# Patient Record
Sex: FEMALE | Race: WHITE | NOT HISPANIC OR LATINO | Employment: UNEMPLOYED | URBAN - METROPOLITAN AREA
[De-identification: names, ages, dates, MRNs, and addresses within clinical notes are randomized per-mention and may not be internally consistent; named-entity substitution may affect disease eponyms.]

---

## 2022-06-20 ENCOUNTER — OFFICE VISIT (OUTPATIENT)
Dept: INTERNAL MEDICINE CLINIC | Age: 27
End: 2022-06-20

## 2022-06-20 VITALS
DIASTOLIC BLOOD PRESSURE: 84 MMHG | OXYGEN SATURATION: 99 % | HEIGHT: 62 IN | WEIGHT: 105 LBS | TEMPERATURE: 99.5 F | BODY MASS INDEX: 19.32 KG/M2 | SYSTOLIC BLOOD PRESSURE: 114 MMHG | HEART RATE: 82 BPM

## 2022-06-20 DIAGNOSIS — Z13.29 SCREENING FOR THYROID DISORDER: ICD-10-CM

## 2022-06-20 DIAGNOSIS — Z12.4 SCREENING FOR CERVICAL CANCER: ICD-10-CM

## 2022-06-20 DIAGNOSIS — Z00.00 ANNUAL PHYSICAL EXAM: Primary | ICD-10-CM

## 2022-06-20 DIAGNOSIS — Z13.228 SCREENING FOR METABOLIC DISORDER: ICD-10-CM

## 2022-06-20 DIAGNOSIS — Z11.4 SCREENING FOR HIV (HUMAN IMMUNODEFICIENCY VIRUS): ICD-10-CM

## 2022-06-20 DIAGNOSIS — Z13.21 ENCOUNTER FOR VITAMIN DEFICIENCY SCREENING: ICD-10-CM

## 2022-06-20 DIAGNOSIS — Z11.59 NEED FOR HEPATITIS C SCREENING TEST: ICD-10-CM

## 2022-06-20 DIAGNOSIS — F41.9 ANXIETY: ICD-10-CM

## 2022-06-20 DIAGNOSIS — Z13.6 SCREENING FOR CARDIOVASCULAR CONDITION: ICD-10-CM

## 2022-06-20 PROCEDURE — 99385 PREV VISIT NEW AGE 18-39: CPT | Performed by: STUDENT IN AN ORGANIZED HEALTH CARE EDUCATION/TRAINING PROGRAM

## 2022-06-20 NOTE — PROGRESS NOTES
ADULT ANNUAL [address] [facility]    NAME: Kristina Noyola  AGE: 24 y o  SEX: female  : 1995     DATE: 2022     Assessment and Plan:     Problem List Items Addressed This Visit        Other    Anxiety    Relevant Medications    sertraline (Zoloft) 50 mg tablet      Other Visit Diagnoses     Annual physical exam    -  Primary    Screening for cardiovascular condition        Relevant Orders    CBC and Platelet    Screening for metabolic disorder        Relevant Orders    Comprehensive metabolic panel    Screening for thyroid disorder        Relevant Orders    TSH, 3rd generation with Free T4 reflex    Encounter for vitamin deficiency screening        Relevant Orders    Vitamin D 25 hydroxy    Screening for cervical cancer        Relevant Orders    Ambulatory referral to Obstetrics / Gynecology    Screening for HIV (human immunodeficiency virus)        Relevant Orders    HIV 1/2 Antigen/Antibody (4th Generation) w Reflex [facility]    Need for hepatitis C screening test        Relevant Orders    Hepatitis C antibody        1  Annual physical exam   2  Screening for cardiovascular condition  3  Screening for metabolic disorder  4  Screening for thyroid disorder  5  Encounter for vitamin deficiency screening  -no labs or follow-up in many years  -will check labs as mentioned above     6  Screening for cervical cancer  -no history of gyn issues at this time  -will refer to Gynecology at this time for routine well-woman exam, patient is also overdue for Pap smear and HPV co-testing    7  Screening for HIV  8  Need for hepatitis C screen test  -patient has never been screened before for HIV or hepatitis C  -after discussion regarding benefits of screening, patient is agreeable and orders replaced as mentioned above    9   Anxiety   -patient likely has been experiencing chronic anxiety that has recently been unmasked by the stress of her upcoming immigration interview  -PHQ 9 score of 7, indicating mild-moderate depression  -treatment options were discussed, including, but not limited to, referral to behavioral health, pharmacotherapy, or referral to Psychiatry  -patient expressed interest in pharmacotherapy  -we will therefore start Zoloft/sertraline 50 mg daily at this time as her sibling responded very well to this medication for similar symptoms  -90 day supply of medication provided to patient at this time, informed that she will need to call our office to request refill and to review symptom management in 3 months    Immunizations and preventive care screenings were discussed with patient today  Appropriate education was printed on patient's after visit summary  Counseling:  Alcohol/drug use: discussed moderation in alcohol intake, the recommendations for healthy alcohol use, and avoidance of illicit drug use  Dental Health: discussed importance of regular tooth brushing, flossing, and dental visits  Injury prevention: discussed safety/seat belts, safety helmets, smoke detectors, carbon dioxide detectors, and smoking near bedding or upholstery  Sexual health: discussed sexually transmitted diseases, partner selection, use of condoms, avoidance of unintended pregnancy, and contraceptive alternatives  · Exercise: the importance of regular exercise/physical activity was discussed  Recommend exercise 3-5 times per week for at least 30 minutes  Kim Brush is a 24 y o  female with no significant past medical history who presents to the office today as a new patient/to establish care  She is present here today in the office with 1 of her 2 older sisters  Patient used to see an FP, Dr Ly Mejía, locally in [place] but has not been seen in the last 5-6 years  She tells me she does not have a significant past medical history  She has no surgical history    Her past family history is significant for 2 sisters with anxiety, a mother with likely undiagnosed depression, and a healthy father  She tells me she does not take any prescription medications, only a women's Daily multivitamin and Tylenol/NSAIDs as needed for occasional headaches  She denies environmental, food, or drug allergies  She does not smoke or chew tobacco   She vapes flavored nicotine products on a daily basis for the last 2-3 years  She qualifies herself as a social alcohol drinker  She states that she would normally during roughly 2-3 days per week, roughly 1-2 drinks at a time  She has no history of bingeing  She states she has not had a drink of alcohol in the last 3-4 months  She tells me she is   She states that she is sexually active with her  and that they use condoms  She tells me she is not on birth control  She denies any history of gynecologic issues, telling me that she has regular periods every 3-3 5 weeks lasting for 4 days at a time  Patient is an immigrant from [place], though she has been in this country for many years  She tells me that she is currently undergoing the [facility] immigration process  She states that this process is causing her significant anxiety  She tells me that she believes she likely has suffered from some degree of baseline anxiety for at least several years, but that it has been worse in the last 2-2 5 weeks with her worry about this upcoming integration interview  She states that her primary symptom of her anxiety is nausea, though she denies actual vomiting  She tells me she had 1 episode of a hot flash a couple weeks ago that resolved spontaneously after a few minutes  She also tells me that last weekend she had 1 episode of acute abdominal pain that resolved within a couple of hours  She tells me she suffers from occasional palpitations and subjective shortness of breath/worry of about her breathing    She states that she mostly notices the symptoms of nausea and palpitations particularly when she is not active or not distracted by activities  She states she has not been treated for anxiety in the past nor has she spoken to behavioral health in the past   She denies suicidal or homicidal ideations  She states she is interested in treatment as sisters both take medication for anxiety and feels significantly better now because of it  Depression Screening and Follow-up Plan: Patient's depression screening was positive with a PHQ-2 score of 3  Their PHQ-9 score was 7  Patient assessed for underlying major depression  Brief counseling provided and recommend additional follow-up/re-evaluation next office visit  Return in about 1 year (around 6/20/2023) for Annual physical      Chief Complaint:     Chief Complaint   Patient presents with   [address]     Has not been to a doctor in over 7 years   Anxiety      History of Present Illness:     Adult Annual Physical   Patient here for a comprehensive physical exam  The patient reports problems - anxiety  Diet and Physical Activity  · Diet/Nutrition: well balanced diet  · Exercise: vigorous cardiovascular exercise, 3-4 times a week on average and 1-2 hours on average  Depression Screening  PHQ-2/9 Depression Screening    Little interest or pleasure in doing things: 2 - more than half the days  Feeling down, depressed, or hopeless: 1 - several days  Trouble falling or staying asleep, or sleeping too much: 0 - not at all  Feeling tired or having little energy: 1 - several days  Poor appetite or overeating: 3 - nearly every day  Feeling bad about yourself - or that you are a failure or have let yourself or your family down: 0 - not at all  Trouble concentrating on things, such as reading the newspaper or watching television: 0 - not at all  Moving or speaking so slowly that other people could have noticed   Or the opposite - being so fidgety or restless that you have been moving around a lot more than usual: 0 - not at all  Thoughts that you would be better off dead, or of hurting yourself in some way: 0 - not at all  PHQ-2 Score: 3  PHQ-2 Interpretation: POSITIVE depression screen  PHQ-9 Score: 7   PHQ-9 Interpretation: Mild depression        General Health  · Sleep: sleeps well  · Hearing: normal - bilateral   · Vision: no vision problems  · Dental: regular dental visits  /GYN Health  · Contraceptive method: barrier methods  · History of STDs?: no      Review of Systems:     Review of Systems   Constitutional: Negative for chills, fatigue and fever  HENT: Negative for congestion, postnasal drip, rhinorrhea, sinus pressure, sinus pain and sore throat  Respiratory: Negative for cough, shortness of breath and wheezing  Cardiovascular: Negative for chest pain and palpitations  Gastrointestinal: Negative for abdominal pain, constipation, diarrhea, nausea and vomiting  Genitourinary: Negative for difficulty urinating, dysuria, frequency, hematuria and urgency  Musculoskeletal: Negative for arthralgias, back pain, gait problem and myalgias  Skin: Negative for pallor, rash and wound  Neurological: Negative for dizziness, weakness, light-headedness, numbness and headaches  Psychiatric/Behavioral: Negative for behavioral problems, self-injury and suicidal ideas  The patient is nervous/anxious  Past Medical History:     History reviewed  No pertinent past medical history  Past Surgical History:     History reviewed  No pertinent surgical history     Social History:     Social History     Socioeconomic History    Marital status: /Civil Union     Spouse name: None    Number of children: None    Years of education: None    Highest education level: None   Occupational History    None   Tobacco Use    Smoking status: Never Smoker    Smokeless tobacco: Never Used   Vaping Use    Vaping Use: Every day    Substances: Nicotine, Flavoring   Substance and Sexual Activity    Alcohol use: Yes     Comment: weekends/social    Drug use: Not Currently     Types: Marijuana    Sexual activity: Yes     Partners: Male     Birth control/protection: Condom Male   Other Topics Concern    None   Social History Narrative    None     Social Determinants of Health     Financial Resource Strain: Not on file   Food Insecurity: Not on file   Transportation Needs: Not on file   Physical Activity: Not on file   Stress: Not on file   Social Connections: Not on file   Intimate Partner Violence: Not on file   Housing Stability: Not on file      Family History:     Family History   Problem Relation Age of Onset    No Known Problems Mother     No Known Problems Father     Anxiety disorder Sister     Anxiety disorder Sister       Current Medications:     Current Outpatient Medications   Medication Sig Dispense Refill    sertraline (Zoloft) 50 mg tablet Take 1 tablet (50 mg total) by mouth daily 90 tablet 0     No current facility-administered medications for this visit  Allergies:     No Known Allergies   Physical Exam:     /84 (BP Location: Left arm, Patient Position: Sitting, Cuff Size: Adult)   Pulse 82   Temp 99 5 °F (37 5 °C) (Temporal)   Ht 5' 2" (1 575 m)   Wt 47 6 kg (105 lb)   SpO2 99% Comment: ra  BMI 19 20 kg/m²     Physical Exam  Vitals and nursing note reviewed  Constitutional:       General: She is not in acute distress  Appearance: Normal appearance  She is normal weight  She is not ill-appearing, toxic-appearing or diaphoretic  Comments: Patient is pleasant, calm, cooperative  She is seated comfortably on exam table in no acute distress  HENT:      Head: Normocephalic and atraumatic  Eyes:      General: No scleral icterus  Extraocular Movements: Extraocular movements intact  Conjunctiva/sclera: Conjunctivae normal       Pupils: Pupils are equal, round, and reactive to light  Cardiovascular:      Rate and Rhythm: Normal rate and regular rhythm  Pulses: Normal pulses  Heart sounds: Normal heart sounds  No murmur heard  No friction rub  No gallop  Pulmonary:      Effort: Pulmonary effort is normal  No respiratory distress  Breath sounds: Normal breath sounds  No stridor  No wheezing or rhonchi  Abdominal:      General: Abdomen is flat  Bowel sounds are normal  There is no distension  Palpations: Abdomen is soft  There is no mass  Tenderness: There is no abdominal tenderness  There is no right CVA tenderness, left CVA tenderness, guarding or rebound  Hernia: No hernia is present  Musculoskeletal:         General: No swelling, tenderness, deformity or signs of injury  Cervical back: Neck supple  Right lower leg: No edema  Left lower leg: No edema  Lymphadenopathy:      Cervical: No cervical adenopathy  Skin:     General: Skin is warm and dry  Capillary Refill: Capillary refill takes less than 2 seconds  Coloration: Skin is not pale  Findings: No erythema or rash  Neurological:      General: No focal deficit present  Mental Status: She is alert  Psychiatric:         Mood and Affect: Mood normal          Behavior: Behavior normal          Thought Content:  Thought content normal          Judgment: Judgment normal           Kaiser Saleem DO   [address]

## 2022-06-20 NOTE — PATIENT INSTRUCTIONS

## 2022-10-18 ENCOUNTER — OFFICE VISIT (OUTPATIENT)
Dept: URGENT CARE | Age: 27
End: 2022-10-18
Payer: COMMERCIAL

## 2022-10-18 VITALS
BODY MASS INDEX: 21.34 KG/M2 | WEIGHT: 125 LBS | HEART RATE: 76 BPM | HEIGHT: 64 IN | OXYGEN SATURATION: 99 % | TEMPERATURE: 97.9 F | RESPIRATION RATE: 20 BRPM | SYSTOLIC BLOOD PRESSURE: 130 MMHG | DIASTOLIC BLOOD PRESSURE: 67 MMHG

## 2022-10-18 DIAGNOSIS — Z02.4 DRIVER'S PERMIT PE (PHYSICAL EXAMINATION): Primary | ICD-10-CM

## 2022-10-18 PROCEDURE — G0382 LEV 3 HOSP TYPE B ED VISIT: HCPCS | Performed by: STUDENT IN AN ORGANIZED HEALTH CARE EDUCATION/TRAINING PROGRAM

## 2022-10-18 NOTE — PROGRESS NOTES
[address] [facility]        NAME: Radha Duarte is a 24 y o  female  : 1995    MRN: [id number]  DATE: 2022  TIME: 1:58 PM    Assessment and Plan   's permit PE (physical examination) [Z02 4]  1  's permit PE (physical examination)           Patient Instructions       Follow up with PCP in 3-5 days  Proceed to  ER if symptoms worsen  Chief Complaint     Chief Complaint   Patient presents with   • Annual Exam     [employer] [facility]   UN CORRECTIVE BOTH EYES 20/20, LEFT 20/20, RIGHT 20/25, COLOR GOOD         History of Present Illness       HPI  Presents for 's license physical no complaints at this time  Review of Systems   Review of Systems  Per HPI    Current Medications       Current Outpatient Medications:   •  sertraline (Zoloft) 50 mg tablet, Take 1 tablet (50 mg total) by mouth daily, Disp: 90 tablet, Rfl: 0    Current Allergies     Allergies as of 10/18/2022   • (No Known Allergies)            The following portions of the patient's history were reviewed and updated as appropriate: allergies, current medications, past family history, past medical history, past social history, past surgical history and problem list      History reviewed  No pertinent past medical history  History reviewed  No pertinent surgical history  Family History   Problem Relation Age of Onset   • No Known Problems Mother    • No Known Problems Father    • Anxiety disorder Sister    • Anxiety disorder Sister          Medications have been verified  Objective   /67   Pulse 76   Temp 97 9 °F (36 6 °C) (Temporal)   Resp 20   Ht 5' 4" (1 626 m)   Wt 56 7 kg (125 lb)   LMP 2022 (Exact Date)   SpO2 99%   BMI 21 46 kg/m²   Patient's last menstrual period was 2022 (exact date)  Physical Exam     Physical Exam  Constitutional:       Appearance: She is well-developed  HENT:      Head: Normocephalic and atraumatic        Right Ear: Tympanic membrane normal  There is no impacted cerumen  Left Ear: Tympanic membrane normal       Nose: Nose normal  No congestion  Mouth/Throat:      Mouth: Mucous membranes are moist       Pharynx: Oropharynx is clear  No oropharyngeal exudate  Eyes:      Conjunctiva/sclera: Conjunctivae normal       Pupils: Pupils are equal, round, and reactive to light  Cardiovascular:      Rate and Rhythm: Normal rate and regular rhythm  Heart sounds: Normal heart sounds  Pulmonary:      Effort: Pulmonary effort is normal  No respiratory distress  Breath sounds: Normal breath sounds  No wheezing  Chest:      Chest wall: No tenderness  Abdominal:      General: Bowel sounds are normal  There is no distension  Palpations: Abdomen is soft  There is no mass  Tenderness: There is no abdominal tenderness  Musculoskeletal:         General: No swelling  Normal range of motion  Cervical back: Normal range of motion and neck supple  Lymphadenopathy:      Cervical: No cervical adenopathy  Skin:     General: Skin is warm  Neurological:      Mental Status: She is alert and oriented to person, place, and time

## 2024-05-31 NOTE — PROGRESS NOTES
S: 28 y.o.  who presents for viability scan with LMP of 3/25/24. She is 10 weeks and 0 days by her LMP. She reports that she is feeling relatively well. She denies cramping or vaginal bleeding. This is  a planned and welcomed pregnancy.      History reviewed. No pertinent past medical history.    OB History    Para Term  AB Living   1             SAB IAB Ectopic Multiple Live Births                  # Outcome Date GA Lbr Kg/2nd Weight Sex Type Anes PTL Lv   1 Current                 O:  Vitals:    24 1032   BP: 104/70   BP Location: Left arm   Patient Position: Sitting   Cuff Size: Standard   Weight: 64.9 kg (143 lb)            TVUS: viable, gonzalez  IUP at 9 weeks 0 days with CRL 24mm. FHT 170bpm. ABDIRIZAK 25. Final dating via US. CRL < dates by approx 7 days, will adjust ABDIRIZAK to be in line with initial US.                       A/P:  #1. IUP at 9 weeks and o days  - Viable pregnancy on TVUS  - RTC in 1-2 week for nurse intake visit    Problem List Items Addressed This Visit    None  Visit Diagnoses       Amenorrhea    -  Primary    Relevant Orders    [facility] US OB < 14 weeks single or first gestation level 1 (Completed)    Early stage of pregnancy        Relevant Orders    Ambulatory Referral to Maternal Fetal Medicine            Minna Santillan PA-C  OB/GYN  6/3/2024  11:15 AM

## 2024-06-03 ENCOUNTER — ULTRASOUND (OUTPATIENT)
Dept: OBGYN CLINIC | Facility: CLINIC | Age: 29
End: 2024-06-03
Payer: COMMERCIAL

## 2024-06-03 VITALS — DIASTOLIC BLOOD PRESSURE: 70 MMHG | SYSTOLIC BLOOD PRESSURE: 104 MMHG | BODY MASS INDEX: 24.55 KG/M2 | WEIGHT: 143 LBS

## 2024-06-03 DIAGNOSIS — Z34.90 EARLY STAGE OF PREGNANCY: ICD-10-CM

## 2024-06-03 DIAGNOSIS — N91.2 AMENORRHEA: Primary | ICD-10-CM

## 2024-06-03 PROCEDURE — 76817 TRANSVAGINAL US OBSTETRIC: CPT | Performed by: PHYSICIAN ASSISTANT

## 2024-06-03 PROCEDURE — 99214 OFFICE O/P EST MOD 30 MIN: CPT | Performed by: PHYSICIAN ASSISTANT

## 2024-06-04 ENCOUNTER — OB ABSTRACT (OUTPATIENT)
Dept: OBGYN CLINIC | Facility: CLINIC | Age: 29
End: 2024-06-04

## 2024-06-10 NOTE — PATIENT INSTRUCTIONS
.Congratulations!! Please review our Pregnancy Essential Guide and [facility] L&D Virtual tour from our networks website.     [facility] Pregnancy Essentials Guide  [facility] ([email or web address])     [facility] ([employer])

## 2024-06-11 ENCOUNTER — INITIAL PRENATAL (OUTPATIENT)
Dept: OBGYN CLINIC | Facility: CLINIC | Age: 29
End: 2024-06-11

## 2024-06-11 VITALS — BODY MASS INDEX: 24.45 KG/M2 | WEIGHT: 143.2 LBS | HEIGHT: 64 IN

## 2024-06-11 DIAGNOSIS — Z34.01 ENCOUNTER FOR SUPERVISION OF NORMAL FIRST PREGNANCY IN FIRST TRIMESTER: Primary | ICD-10-CM

## 2024-06-11 DIAGNOSIS — Z31.430 ENCOUNTER OF FEMALE FOR TESTING FOR GENETIC DISEASE CARRIER STATUS FOR PROCREATIVE MANAGEMENT: ICD-10-CM

## 2024-06-11 DIAGNOSIS — Z36.9 UNSPECIFIED ANTENATAL SCREENING: ICD-10-CM

## 2024-06-11 PROCEDURE — OBC

## 2024-06-11 NOTE — PROGRESS NOTES
.  OB INTAKE INTERVIEW  Patient is 28 y.o. who presents for OB intake at 10.1wks  She is accompanied by  spouse during this encounter  The father of her baby (Mike) is involved in the pregnancy and is 26 years old.      Last Menstrual Period: 2024  Ultrasound: Measured 9 weeks 0 days on 2024  Estimated Date of Delivery: 2025 confirmed by 9 week US    Signs/Symptoms of Pregnancy  Current pregnancy symptoms: breast tenderness, tiredness,  Constipation no  Headaches YES  Cramping/spotting  occasional cramping   PICA cravings no    Diabetes-  Body mass index is 24.58 kg/m².  If patient has 1 or more, please order early 1 hour GTT  History of GDM no  BMI >35 no  History of PCOS or current metformin use no  History of LGA/macrosomic infant (4000g/9lbs) no    If patient has 2 or more, please order early 1 hour GTT  BMI>30 no  AMA no  First degree relative with type 2 diabetes no  History of chronic HTN, hyperlipidemia, elevated A1C no  High risk race (, , ,  or ) no    Hypertension- if you answer yes to any of the following, please order baseline preeclampsia labs (cbc, comprehensive metabolic panel, urine protein creatinine ratio, uric acid)  History of of chronic HTN no  History of gestational HTN no  History of preeclampsia, eclampsia, or HELLP syndrome no  History of diabetes no  History of lupus, autoimmune disease, kidney disease no    Thyroid- if yes order TSH with reflex T4  History of thyroid disease no    Bleeding Disorder or Hx of DVT-patient or first degree relative with history of. Order the following if not done previously.   (Factor V, antithrombin III, prothrombin gene mutation, protein C and S Ag, lupus anticoagulant, anticardiolipin, beta-2 glycoprotein)   no    OB/GYN-  History of abnormal pap smear no       Date of last pap smear  never had one   History of HPV no  History of Herpes/HSV no  History of other STI (gonorrhea,  chlamydia, trich) no  History of prior  no  History of prior  no  History of  delivery prior to 36 weeks 6 days no  History of blood transfusion no  Ok for blood transfusion yes    Substance screening-   History of tobacco use  Vaped quit when found out she was pregnant  Currently using tobacco no  Substance Use Screen Level (N/A, LOW, HIGH) LOw    MRSA Screening-   Does the pt have a hx of MRSA? no    Immunizations:  Influenza vaccine given this season no  Discussed Tdap vaccine yes  Discussed COVID Vaccine yes    Genetic/[facility]-  Do you or your partner have a history of any of the following in yourselves or first degree relatives?  Cystic fibrosis no  Spinal muscular atrophy no  Hemoglobinopathy/Sickle Cell/Thalassemia no  Fragile X Intellectual Disability no         discussed Carrier Screening being completed once in a lifetime as a standard of care lab test.  Lab work ordered    Appointment for Nuchal Translucency Ultrasound at [facility] scheduled for 2024      Interview education  [facility] Pregnancy Essentials Book reviewed, discussed and attached to their AVS yes    Nurse/Family Partnership- patient may qualify  yes referral placed yes    Prenatal lab work scripts yes  Extra labs ordered:  none    Aspirin/Preeclampsia Screen    Risk Level Risk Factor Recommendation   LOW Prior Uncomplicated full-term delivery no No Aspirin recommendation        MODERATE Nulliparity YES Recommend low-dose aspirin if     BMI>30 no 2 or more moderate risk factors    Family History Preeclampsia (mother/sister) no     35yr old or greater no     Black Race, Concern for SDOH/Low Socioeconomic no     IVF Pregnancy  no     Personal History Risks (low birth weight, prior adverse preg outcome, >10yr preg interval) no         HIGH History of Preeclampsia no Recommend low-dose aspirin if     Multifetal gestation no 1 or more high risk factors    Chronic HTN no     Type 1 or 2 Diabetes no     Renal Disease no     Autoimmune  Disease  no      Contraindications to ASA therapy:  NSAID/ ASA allergy: no  Nasal polyps: no  Asthma with history of ASA induced bronchospasm: no  Relative contraindications:  History of GI bleed: no  Active peptic ulcer disease: no  Severe hepatic dysfunction: no    Patient should be recommended to take ASA 162mg during this pregnancy from 12-36wks to lower her risk of preeclampsia:  discussed      The patient has a history now or in prior pregnancy notable for:  none      Details that I feel the provider should be aware of:  Patient from [place]    PN1 visit scheduled. The patient was oriented to our practice, the navigator role, reviewed delivering physicians and [facility] for Delivery. All questions were answered.    Interviewed by: Silvia Lynch LPN, Ob Nurse Navigator

## 2024-06-17 ENCOUNTER — APPOINTMENT (OUTPATIENT)
Dept: LAB | Facility: CLINIC | Age: 29
End: 2024-06-17
Payer: COMMERCIAL

## 2024-06-17 DIAGNOSIS — Z34.01 ENCOUNTER FOR SUPERVISION OF NORMAL FIRST PREGNANCY IN FIRST TRIMESTER: ICD-10-CM

## 2024-06-17 LAB
ABO GROUP BLD: NORMAL
AMORPH URATE CRY URNS QL MICRO: NORMAL
BACTERIA UR QL AUTO: NORMAL /HPF
BASOPHILS # BLD AUTO: 0.03 THOUSANDS/ÂΜL (ref 0–0.1)
BASOPHILS NFR BLD AUTO: 0 % (ref 0–1)
BILIRUB UR QL STRIP: NEGATIVE
BLD GP AB SCN SERPL QL: NEGATIVE
CLARITY UR: ABNORMAL
COLOR UR: ABNORMAL
EOSINOPHIL # BLD AUTO: 0.04 THOUSAND/ÂΜL (ref 0–0.61)
EOSINOPHIL NFR BLD AUTO: 1 % (ref 0–6)
ERYTHROCYTE [DISTWIDTH] IN BLOOD BY AUTOMATED COUNT: 12.4 % (ref 11.6–15.1)
GLUCOSE UR STRIP-MCNC: NEGATIVE MG/DL
HCT VFR BLD AUTO: 38.9 % (ref 34.8–46.1)
HGB BLD-MCNC: 13 G/DL (ref 11.5–15.4)
HGB UR QL STRIP.AUTO: NEGATIVE
IMM GRANULOCYTES # BLD AUTO: 0.03 THOUSAND/UL (ref 0–0.2)
IMM GRANULOCYTES NFR BLD AUTO: 0 % (ref 0–2)
KETONES UR STRIP-MCNC: NEGATIVE MG/DL
LEUKOCYTE ESTERASE UR QL STRIP: NEGATIVE
LYMPHOCYTES # BLD AUTO: 1.44 THOUSANDS/ÂΜL (ref 0.6–4.47)
LYMPHOCYTES NFR BLD AUTO: 17 % (ref 14–44)
MCH RBC QN AUTO: 31.3 PG (ref 26.8–34.3)
MCHC RBC AUTO-ENTMCNC: 33.4 G/DL (ref 31.4–37.4)
MCV RBC AUTO: 94 FL (ref 82–98)
MONOCYTES # BLD AUTO: 0.59 THOUSAND/ÂΜL (ref 0.17–1.22)
MONOCYTES NFR BLD AUTO: 7 % (ref 4–12)
NEUTROPHILS # BLD AUTO: 6.58 THOUSANDS/ÂΜL (ref 1.85–7.62)
NEUTS SEG NFR BLD AUTO: 75 % (ref 43–75)
NITRITE UR QL STRIP: NEGATIVE
NON-SQ EPI CELLS URNS QL MICRO: NORMAL /HPF
NRBC BLD AUTO-RTO: 0 /100 WBCS
PH UR STRIP.AUTO: 7.5 [PH]
PLATELET # BLD AUTO: 265 THOUSANDS/UL (ref 149–390)
PMV BLD AUTO: 10.7 FL (ref 8.9–12.7)
PROT UR STRIP-MCNC: ABNORMAL MG/DL
RBC # BLD AUTO: 4.16 MILLION/UL (ref 3.81–5.12)
RBC #/AREA URNS AUTO: NORMAL /HPF
RH BLD: POSITIVE
RUBV IGG SERPL IA-ACNC: 10.2 IU/ML
SP GR UR STRIP.AUTO: 1.01 (ref 1–1.03)
SPECIMEN EXPIRATION DATE: NORMAL
UROBILINOGEN UR STRIP-ACNC: <2 MG/DL
WBC # BLD AUTO: 8.71 THOUSAND/UL (ref 4.31–10.16)
WBC #/AREA URNS AUTO: NORMAL /HPF

## 2024-06-17 PROCEDURE — 85025 COMPLETE CBC W/AUTO DIFF WBC: CPT

## 2024-06-17 PROCEDURE — 87340 HEPATITIS B SURFACE AG IA: CPT

## 2024-06-17 PROCEDURE — 86900 BLOOD TYPING SEROLOGIC ABO: CPT

## 2024-06-17 PROCEDURE — 36415 COLL VENOUS BLD VENIPUNCTURE: CPT

## 2024-06-17 PROCEDURE — 86787 VARICELLA-ZOSTER ANTIBODY: CPT

## 2024-06-17 PROCEDURE — 86706 HEP B SURFACE ANTIBODY: CPT

## 2024-06-17 PROCEDURE — 81001 URINALYSIS AUTO W/SCOPE: CPT

## 2024-06-17 PROCEDURE — 86803 HEPATITIS C AB TEST: CPT

## 2024-06-17 PROCEDURE — 86850 RBC ANTIBODY SCREEN: CPT

## 2024-06-17 PROCEDURE — 87086 URINE CULTURE/COLONY COUNT: CPT

## 2024-06-17 PROCEDURE — 86762 RUBELLA ANTIBODY: CPT

## 2024-06-17 PROCEDURE — 87389 HIV-1 AG W/HIV-1&-2 AB AG IA: CPT

## 2024-06-17 PROCEDURE — 86901 BLOOD TYPING SEROLOGIC RH(D): CPT

## 2024-06-17 PROCEDURE — 86780 TREPONEMA PALLIDUM: CPT

## 2024-06-18 LAB
BACTERIA UR CULT: NORMAL
HBV SURFACE AB SER-ACNC: 102 MIU/ML
HBV SURFACE AG SER QL: NORMAL
HCV AB SER QL: NORMAL
HIV 1+2 AB+HIV1 P24 AG SERPL QL IA: NORMAL
HIV 2 AB SERPL QL IA: NORMAL
HIV1 AB SERPL QL IA: NORMAL
HIV1 P24 AG SERPL QL IA: NORMAL
TREPONEMA PALLIDUM IGG+IGM AB [PRESENCE] IN SERUM OR PLASMA BY IMMUNOASSAY: NORMAL
VZV IGG SER QL IA: NORMAL

## 2024-06-26 ENCOUNTER — ROUTINE PRENATAL (OUTPATIENT)
Dept: PERINATAL CARE | Facility: CLINIC | Age: 29
End: 2024-06-26
Payer: COMMERCIAL

## 2024-06-26 VITALS
DIASTOLIC BLOOD PRESSURE: 70 MMHG | BODY MASS INDEX: 24.41 KG/M2 | HEART RATE: 64 BPM | WEIGHT: 143 LBS | HEIGHT: 64 IN | SYSTOLIC BLOOD PRESSURE: 110 MMHG

## 2024-06-26 DIAGNOSIS — N83.209 OVARIAN CYST AFFECTING PREGNANCY IN FIRST TRIMESTER, ANTEPARTUM: ICD-10-CM

## 2024-06-26 DIAGNOSIS — Z36.82 ENCOUNTER FOR (NT) NUCHAL TRANSLUCENCY SCAN: ICD-10-CM

## 2024-06-26 DIAGNOSIS — O34.81 OVARIAN CYST AFFECTING PREGNANCY IN FIRST TRIMESTER, ANTEPARTUM: ICD-10-CM

## 2024-06-26 DIAGNOSIS — Z34.90 EARLY STAGE OF PREGNANCY: ICD-10-CM

## 2024-06-26 DIAGNOSIS — Z3A.12 12 WEEKS GESTATION OF PREGNANCY: Primary | ICD-10-CM

## 2024-06-26 PROCEDURE — 76813 OB US NUCHAL MEAS 1 GEST: CPT | Performed by: STUDENT IN AN ORGANIZED HEALTH CARE EDUCATION/TRAINING PROGRAM

## 2024-06-26 PROCEDURE — 99243 OFF/OP CNSLTJ NEW/EST LOW 30: CPT | Performed by: STUDENT IN AN ORGANIZED HEALTH CARE EDUCATION/TRAINING PROGRAM

## 2024-06-26 PROCEDURE — 76817 TRANSVAGINAL US OBSTETRIC: CPT | Performed by: STUDENT IN AN ORGANIZED HEALTH CARE EDUCATION/TRAINING PROGRAM

## 2024-06-26 PROCEDURE — 76801 OB US < 14 WKS SINGLE FETUS: CPT | Performed by: STUDENT IN AN ORGANIZED HEALTH CARE EDUCATION/TRAINING PROGRAM

## 2024-06-26 PROCEDURE — 36415 COLL VENOUS BLD VENIPUNCTURE: CPT | Performed by: STUDENT IN AN ORGANIZED HEALTH CARE EDUCATION/TRAINING PROGRAM

## 2024-06-26 NOTE — PROGRESS NOTES
Ultrasound Probe Disinfection    A transvaginal ultrasound was performed.   Prior to use, disinfection was performed with High Level Disinfection Process ([employer]).  Probe serial number [id number] SPENCER was used.      Annette Gonzalez  06/26/24  11:22 AM

## 2024-06-26 NOTE — PROGRESS NOTES
"[facility]: Ms. Montez was seen today for nuchal translucency ultrasound.  See ultrasound report under \"OB Procedures\" tab.      Physical Exam  Constitutional:       General: She is not in acute distress.     Appearance: Normal appearance.   HENT:      Head: Normocephalic and atraumatic.   Eyes:      Extraocular Movements: Extraocular movements intact.   Cardiovascular:      Rate and Rhythm: Normal rate.   Pulmonary:      Effort: Pulmonary effort is normal. No respiratory distress.   Skin:     Findings: No erythema or rash.   Neurological:      Mental Status: She is alert and oriented to person, place, and time.   Psychiatric:         Mood and Affect: Mood normal.         Behavior: Behavior normal.         Please don't hesitate to contact our office with any concerns or questions.  -Cyndee Ridley MD      "

## 2024-06-26 NOTE — LETTER
"2024     Minna Santillan PA-C  [address] [facility] [place] [address]    Patient: Ya Montez   YOB: 1995   Date of Visit: 2024       Dear Dr. Santillan:    Thank you for referring Ya Montez to me for evaluation. Below are my notes for this consultation.    If you have questions, please do not hesitate to call me. I look forward to following your patient along with you.         Sincerely,        Cyndee Ridley MD        CC: No Recipients    Cyndee Ridley MD  2024 11:58 AM  Sign when Signing Visit  [facility]: Ms. Montez was seen today for nuchal translucency ultrasound.  See ultrasound report under \"OB Procedures\" tab.      Physical Exam  Constitutional:       General: She is not in acute distress.     Appearance: Normal appearance.   HENT:      Head: Normocephalic and atraumatic.   Eyes:      Extraocular Movements: Extraocular movements intact.   Cardiovascular:      Rate and Rhythm: Normal rate.   Pulmonary:      Effort: Pulmonary effort is normal. No respiratory distress.   Skin:     Findings: No erythema or rash.   Neurological:      Mental Status: She is alert and oriented to person, place, and time.   Psychiatric:         Mood and Affect: Mood normal.         Behavior: Behavior normal.         Please don't hesitate to contact our office with any concerns or questions.  -Cyndee Ridley MD        Fadumo Hansen  2024 11:40 AM  Sign when Signing Visit  Patient chose to have [facility] [id number] Non-Invasive Prenatal Screen [id number] PLUS w/ SCA, WITH fetal sex.  Patient choose to be billed through insurance.     Patient given brochure and is aware [facility] will contact patient's insurance and coordinate coverage.  Provided [facility] contact information. General inquiries [phone], Cost estimates [phone] and [facility] Billing [phone]. Website [email or web address].     Blood " collection tubes labeled with patient identifiers (name, medical record number, and date of birth).     Filled out [facility] order form. Patient chose to have blood drawn in our office at time of visit. NIPS was drawn from right arm with a straight needle by Fadumo TAVERA .      If patient chose to have blood work drawn at a [facility] lab we requested patient notify MFM (via phone call or [employer] message) when blood collected so office can follow up on results.       Maternal Fetal Medicine will have results in approximately 5-7 business days and will call patient or notify via [employer].  Patient aware viewing lab result online will reveal fetal sex if ordered.    Patient verbalized understanding of all instructions and no questions at this time.

## 2024-06-26 NOTE — PROGRESS NOTES
Patient chose to have [facility] [id number] Non-Invasive Prenatal Screen [id number] PLUS w/ SCA, WITH fetal sex.  Patient choose to be billed through insurance.     Patient given brochure and is aware [facility] will contact patient's insurance and coordinate coverage.  Provided [facility] contact information. General inquiries [phone], Cost estimates [phone] and [facility] Billing [phone]. Website [email or web address].     Blood collection tubes labeled with patient identifiers (name, medical record number, and date of birth).     Filled out [facility] order form. Patient chose to have blood drawn in our office at time of visit. NIPS was drawn from right arm with a straight needle by Fadumo TAVERA .      If patient chose to have blood work drawn at a [facility] lab we requested patient notify MFM (via phone call or [employer] message) when blood collected so office can follow up on results.       Maternal Fetal Medicine will have results in approximately 5-7 business days and will call patient or notify via [employer].  Patient aware viewing lab result online will reveal fetal sex if ordered.    Patient verbalized understanding of all instructions and no questions at this time.

## 2024-06-30 LAB
CFDNA.FET/CFDNA.TOTAL SFR FETUS: NORMAL %
CITATION REF LAB TEST: NORMAL
FET 13+18+21+X+Y ANEUP PLAS.CFDNA: NEGATIVE
FET CHR 21 TS PLAS.CFDNA QL: NEGATIVE
FET CHR 21 TS PLAS.CFDNA QL: NEGATIVE
FET MS X RISK WBC.DNA+CFDNA QL: NOT DETECTED
FET SEX PLAS.CFDNA DOSAGE CFDNA: NORMAL
FET TS 13 RISK PLAS.CFDNA QL: NEGATIVE
FET X + Y ANEUP RISK PLAS.CFDNA SEQ-IMP: NOT DETECTED
GA EST FROM CONCEPTION DATE: NORMAL D
GESTATIONAL AGE > 9:: YES
LAB DIRECTOR NAME PROVIDER: NORMAL
LAB DIRECTOR NAME PROVIDER: NORMAL
LABORATORY COMMENT REPORT: NORMAL
LIMITATIONS OF THE TEST: NORMAL
NEGATIVE PREDICTIVE VALUE: NORMAL
PERFORMANCE CHARACTERISTICS: NORMAL
POSITIVE PREDICTIVE VALUE: NORMAL
REF LAB TEST METHOD: NORMAL
SL AMB NOTE:: NORMAL
TEST PERFORMANCE INFO SPEC: NORMAL

## 2024-07-01 ENCOUNTER — HOSPITAL ENCOUNTER (OUTPATIENT)
Dept: RADIOLOGY | Age: 29
Discharge: HOME | End: 2024-07-01
Payer: COMMERCIAL

## 2024-07-01 DIAGNOSIS — N83.209 OVARIAN CYST AFFECTING PREGNANCY IN FIRST TRIMESTER, ANTEPARTUM: ICD-10-CM

## 2024-07-01 DIAGNOSIS — O34.81 OVARIAN CYST AFFECTING PREGNANCY IN FIRST TRIMESTER, ANTEPARTUM: ICD-10-CM

## 2024-07-01 PROCEDURE — 76815 OB US LIMITED FETUS(S): CPT

## 2024-07-02 NOTE — PROGRESS NOTES
OB/GYN  PN Visit  Ya Montez  [id number]  7/3/2024  2:30 PM  Minna Santillan PA-C    S: 29 y.o.  13w1d here for PN visit. Pregnancy complicated by anxiety.     OB Complaints:  Denies c/o n/v/ha, no edema, no smoking, no DV.   No vb/lof  No cramping/ctxns or signs of PTL.    She reports that she is feeling well.  Established care with MFM. Had normal NIPS. It's a boy!!    O:    Pre-Leanna Vitals    Flowsheet Row Most Recent Value   Prenatal Assessment    Fetal Heart Rate 147   Fundal Height (cm) 14 cm   Prenatal Vitals    Blood Pressure 106/74   Weight - Scale 64.9 kg (143 lb)   Urine Albumin/Glucose    Dilation/Effacement/Station    Vaginal Drainage    Draining Fluid No   Edema    LLE Edema None   RLE Edema None   Facial Edema None            Gen: no acute distress, nonlabored breathing.  OB exam completed: fundal height, +FHT  Urine: -/-    A/P:  #1. 13w2d GESTATION  Physical exam and cultures done today. Pap done today per ASCCP guidelines. Never had pap previously.   AFP ordered and reviewed with patient to complete between 15-22 weeks.       RTC in 4 weeks

## 2024-07-03 ENCOUNTER — INITIAL PRENATAL (OUTPATIENT)
Dept: OBGYN CLINIC | Facility: CLINIC | Age: 29
End: 2024-07-03

## 2024-07-03 VITALS
WEIGHT: 143 LBS | DIASTOLIC BLOOD PRESSURE: 74 MMHG | SYSTOLIC BLOOD PRESSURE: 106 MMHG | HEIGHT: 64 IN | BODY MASS INDEX: 24.41 KG/M2

## 2024-07-03 DIAGNOSIS — Z34.92 SECOND TRIMESTER PREGNANCY: Primary | ICD-10-CM

## 2024-07-03 PROCEDURE — 87591 N.GONORRHOEAE DNA AMP PROB: CPT | Performed by: PHYSICIAN ASSISTANT

## 2024-07-03 PROCEDURE — 87491 CHLMYD TRACH DNA AMP PROBE: CPT | Performed by: PHYSICIAN ASSISTANT

## 2024-07-03 PROCEDURE — PNV: Performed by: PHYSICIAN ASSISTANT

## 2024-07-03 PROCEDURE — G0145 SCR C/V CYTO,THINLAYER,RESCR: HCPCS | Performed by: PHYSICIAN ASSISTANT

## 2024-07-09 LAB
C TRACH DNA SPEC QL NAA+PROBE: NEGATIVE
N GONORRHOEA DNA SPEC QL NAA+PROBE: NEGATIVE

## 2024-07-15 LAB
LAB AP GYN PRIMARY INTERPRETATION: NORMAL
LAB AP LMP: NORMAL
Lab: NORMAL

## 2024-07-23 ENCOUNTER — TELEPHONE (OUTPATIENT)
Dept: OBGYN CLINIC | Facility: CLINIC | Age: 29
End: 2024-07-23

## 2024-07-23 NOTE — TELEPHONE ENCOUNTER
Called the patient for a 2nd trimester call, left a voice message to return the call, also sent a message via my chart :  .Richardson Pandya,    It's time for our 2nd trimester call! I will be reaching out next week to complete our check-in. I have attached a Depression Questionnaire if you could please fill this as soon as you can prior to our phone call, I'd greatly appreciate it!     Please let me know if it is easier to have this check-in via [email or web address], I will gladly message you my questions if this works better for you.       Thank you,  EDDI Vega  OB Navigator   [facility]

## 2024-07-29 NOTE — PROGRESS NOTES
OB/GYN  PN Visit  Ya Montez  [id number]  2024  3:42 PM  DEIDRA Hernandez    S: 29 y.o.  17w3d here for PN visit. Pregnancy complicated by b/l ovarian cysts and anxiety.     OB complaints:  Denies c/o n/v/ha, no edema, no smoking, no DV.   No vb/lof  No cramping/ctxns or signs of PTL.    She reports occasional headaches, relieved with tylenol.    O:    Pre- Vitals      Flowsheet Row Most Recent Value   Prenatal Assessment    Fetal Heart Rate 148   Prenatal Vitals    Blood Pressure 104/72   Weight - Scale 66.2 kg (146 lb)   Urine Albumin/Glucose    Dilation/Effacement/Station    Vaginal Drainage    Edema               Gen: no acute distress, nonlabored breathing.  OB exam completed: fundal height, +FHT.  Urine: -/-    A/P:  Problem List Items Addressed This Visit    None  Visit Diagnoses       Prenatal care in second trimester    -  Primary          #1. 17w3d GESTATION  Labor precautions reviewed  Labs UTD. Recommend completing msAFP.   Level II US scheduled 24.    RTC in 4 weeks    DEIDRA Hernandez  2024  3:42 PM

## 2024-08-01 ENCOUNTER — ROUTINE PRENATAL (OUTPATIENT)
Dept: OBGYN CLINIC | Facility: CLINIC | Age: 29
End: 2024-08-01

## 2024-08-01 VITALS
HEIGHT: 64 IN | DIASTOLIC BLOOD PRESSURE: 72 MMHG | BODY MASS INDEX: 24.92 KG/M2 | WEIGHT: 146 LBS | SYSTOLIC BLOOD PRESSURE: 104 MMHG

## 2024-08-01 DIAGNOSIS — Z34.92 PRENATAL CARE IN SECOND TRIMESTER: Primary | ICD-10-CM

## 2024-08-01 PROCEDURE — PNV

## 2024-08-21 ENCOUNTER — ROUTINE PRENATAL (OUTPATIENT)
Dept: PERINATAL CARE | Facility: CLINIC | Age: 29
End: 2024-08-21
Payer: COMMERCIAL

## 2024-08-21 VITALS
OXYGEN SATURATION: 99 % | WEIGHT: 148.6 LBS | HEART RATE: 81 BPM | HEIGHT: 64 IN | BODY MASS INDEX: 25.37 KG/M2 | SYSTOLIC BLOOD PRESSURE: 108 MMHG | DIASTOLIC BLOOD PRESSURE: 56 MMHG

## 2024-08-21 DIAGNOSIS — Z36.86 ENCOUNTER FOR ANTENATAL SCREENING FOR CERVICAL LENGTH: ICD-10-CM

## 2024-08-21 DIAGNOSIS — O34.82 OVARIAN CYST DURING PREGNANCY IN SECOND TRIMESTER: ICD-10-CM

## 2024-08-21 DIAGNOSIS — N83.209 OVARIAN CYST DURING PREGNANCY IN SECOND TRIMESTER: ICD-10-CM

## 2024-08-21 DIAGNOSIS — Z36.3 ENCOUNTER FOR ANTENATAL SCREENING FOR MALFORMATION: ICD-10-CM

## 2024-08-21 DIAGNOSIS — Z3A.20 20 WEEKS GESTATION OF PREGNANCY: Primary | ICD-10-CM

## 2024-08-21 PROCEDURE — 76817 TRANSVAGINAL US OBSTETRIC: CPT | Performed by: STUDENT IN AN ORGANIZED HEALTH CARE EDUCATION/TRAINING PROGRAM

## 2024-08-21 PROCEDURE — 76805 OB US >/= 14 WKS SNGL FETUS: CPT | Performed by: STUDENT IN AN ORGANIZED HEALTH CARE EDUCATION/TRAINING PROGRAM

## 2024-08-21 PROCEDURE — 99212 OFFICE O/P EST SF 10 MIN: CPT | Performed by: STUDENT IN AN ORGANIZED HEALTH CARE EDUCATION/TRAINING PROGRAM

## 2024-08-21 NOTE — PROGRESS NOTES
Ultrasound Probe Disinfection    A transvaginal ultrasound was performed.   Prior to use, disinfection was performed with High Level Disinfection Process (PlastiPure).  Probe serial number B2: [id number] was used.    Fatoumata Duarte  08/21/24  10:45 AM

## 2024-08-21 NOTE — PROGRESS NOTES
"[facility]: Ms. Montez was seen today for anatomic survey and cervical length screening ultrasound.  See ultrasound report under \"OB Procedures\" tab.      MDM:   I. Diagnoses/Problems addressed: low  II.  Data:  minimal  III.  Risk of morbidity:  minimal    Please don't hesitate to contact our office with any concerns or questions.  -Cyndee Ridley MD      "

## 2024-08-27 NOTE — PROGRESS NOTES
VISIT 29 yr old  at 21w1d: (+) HA - here and there - tylenol as needed helps; (+) cramping - mild tightening; Denies n/v/vb/lof/edema/dv/smoking; urine neg/neg  Labs up to date; msAFP - aware needs to complete by 21w6d; PNC - level 2 done - no further follow-up scheduled   PNVs + DHA - tolerating daily  Good FM     Encouraged hydration.   RTO in 4 weeks for routine ob check or sooner if needed

## 2024-08-28 ENCOUNTER — ROUTINE PRENATAL (OUTPATIENT)
Dept: OBGYN CLINIC | Facility: CLINIC | Age: 29
End: 2024-08-28

## 2024-08-28 VITALS
HEIGHT: 64 IN | BODY MASS INDEX: 25.61 KG/M2 | DIASTOLIC BLOOD PRESSURE: 72 MMHG | SYSTOLIC BLOOD PRESSURE: 98 MMHG | WEIGHT: 150 LBS

## 2024-08-28 DIAGNOSIS — Z34.02 ENCOUNTER FOR SUPERVISION OF NORMAL FIRST PREGNANCY IN SECOND TRIMESTER: Primary | ICD-10-CM

## 2024-08-28 PROCEDURE — PNV: Performed by: PHYSICIAN ASSISTANT

## 2024-09-23 ENCOUNTER — ROUTINE PRENATAL (OUTPATIENT)
Dept: OBGYN CLINIC | Facility: CLINIC | Age: 29
End: 2024-09-23

## 2024-09-23 VITALS — BODY MASS INDEX: 26.61 KG/M2 | SYSTOLIC BLOOD PRESSURE: 102 MMHG | DIASTOLIC BLOOD PRESSURE: 70 MMHG | WEIGHT: 155 LBS

## 2024-09-23 DIAGNOSIS — Z34.93 PRENATAL CARE IN THIRD TRIMESTER: Primary | ICD-10-CM

## 2024-09-23 PROCEDURE — PNV: Performed by: STUDENT IN AN ORGANIZED HEALTH CARE EDUCATION/TRAINING PROGRAM

## 2024-09-23 NOTE — PROGRESS NOTES
Ya is a 29-year-old G1, P0 at 25 weeks gestation presenting for routine prenatal care-she reports occasional slight cramping but denies any loss of fluid or vaginal bleeding.  She endorses good fetal movement.  Urine negative/negative today.  Pregnancy is complicated by  Bilateral ovarian cysts and a history of anxiety.  Third trimester labs ordered and reviewed today.  Reviewed Tdap vaccine at next visit.  Reviewed ovarian torsion precautions.  Return to office in 3 to 4 weeks or sooner if needed.

## 2024-09-27 ENCOUNTER — TELEPHONE (OUTPATIENT)
Dept: OBGYN CLINIC | Facility: CLINIC | Age: 29
End: 2024-09-27

## 2024-10-03 ENCOUNTER — AMB VIDEO VISIT (OUTPATIENT)
Dept: OTHER | Facility: HOSPITAL | Age: 29
End: 2024-10-03
Payer: COMMERCIAL

## 2024-10-03 PROCEDURE — 99212 OFFICE O/P EST SF 10 MIN: CPT | Performed by: NURSE PRACTITIONER

## 2024-10-03 NOTE — PATIENT INSTRUCTIONS
As we discussed this appears to be contact dermatitis.  I suspect related to lotrimin cream.  I would recommend restarting hydrocoritsone cream.  You can take benadryl.  If worsening you may need oral steriod but would hold of for now due to pregnancy.  You were understandable and agreeable with plan.  Follow up with PCP if no improvement.  Go to ER with any worsening symptoms, chest pain or sob.

## 2024-10-03 NOTE — CARE ANYWHERE EVISITS
Visit Summary for Ya Montez - Gender: Female - Date of Birth: 1995  Date: [id number] - Duration: 10 minutes  Patient: Ya Montez  Provider: Natalie GAY    Patient Contact Information  Address  [address]; [place] [address]  [phone]    Visit Topics  Cold [Added By: Self - 2024-10-03]  Rash [Added By: Self - 2024-10-03]    Triage Questions   What is your current physical address in the event of a medical emergency? Answer []  Are you allergic to any medications? Answer []  Are you now or could you be pregnant? Answer []  Do you have any immune system compromise or chronic lung   disease? Answer []  Do you have any vulnerable family members in the home (infant, pregnant, cancer, elderly)? Answer []     Conversation Transcripts  [0A][0A] [Notification] You are connected with Natalie GAY, Urgent Care Specialist.[0A][Notification] Ya Montez is located in [place].[0A][Notification] Ya Montez has shared health history...[0A]    Diagnosis  Dermatitis, unspecified    Procedures  Value: [id number] Code: CPT-4 UNLISTED E&M SERVICE    Medications Prescribed    No prescriptions ordered    Electronically signed by: Natalie Nunez(NPI [id number])

## 2024-10-03 NOTE — PROGRESS NOTES
Virtual Regular Visit  Name: Ya Montez      : 1995      MRN: [id number]  Encounter Provider: DEIDRA Veloz  Encounter Date: 10/3/2024   Encounter department: VIRTUAL CARE     Verification of patient location:    Patient is located at Home in the following state in which I hold an active license [place]    Assessment & Plan         Encounter provider DEIDRA Veloz    The patient was identified by name and date of birth. Ya Montez was informed that this is a telemedicine visit and that the visit is being conducted through the Phyt[employer] platform. She agrees to proceed..  My office door was closed. No one else was in the room.  She acknowledged consent and understanding of privacy and security of the video platform. The patient has agreed to participate and understands they can discontinue the visit at any time.    Patient is aware this is a billable service.     History of Present Illness     This is a 29 year old female here today for video visit.  She states she had a small rash on her chest.  She thought it was a dermatitis.  She started hydrocortisone.  She states it was small [place] area.  She then she tried lotrimin cream and developed a worsening rash.  She states area is itchy.  He also has a slight cough.  No sob or chest pain. No tongue or lip swelling.   She is pregnant.          History obtained from : patient  Review of Systems   Constitutional: Negative.    Respiratory: Negative.     Cardiovascular: Negative.    Skin:  Positive for rash.           Objective     LMP 2024 (Exact Date)   Physical Exam  Constitutional:       Appearance: Normal appearance.   HENT:      Head: Atraumatic.   Pulmonary:      Effort: Pulmonary effort is normal. No respiratory distress.   Chest:          Comments: Erythemic raised rash localized to area.   Skin:     Findings: Rash present.   Neurological:      Mental Status: She is oriented to person, place, and time.   Psychiatric:          Mood and Affect: Mood normal.         Behavior: Behavior normal.         Thought Content: Thought content normal.         Judgment: Judgment normal.         Visit Time  Total Visit Duration: 9

## 2024-10-08 ENCOUNTER — TELEPHONE (OUTPATIENT)
Dept: OBGYN CLINIC | Facility: CLINIC | Age: 29
End: 2024-10-08

## 2024-10-16 ENCOUNTER — APPOINTMENT (OUTPATIENT)
Dept: LAB | Facility: CLINIC | Age: 29
End: 2024-10-16
Payer: COMMERCIAL

## 2024-10-16 DIAGNOSIS — Z34.93 PRENATAL CARE IN THIRD TRIMESTER: ICD-10-CM

## 2024-10-16 LAB
ERYTHROCYTE [DISTWIDTH] IN BLOOD BY AUTOMATED COUNT: 13 % (ref 11.6–15.1)
GLUCOSE 1H P 50 G GLC PO SERPL-MCNC: 118 MG/DL (ref 70–134)
HCT VFR BLD AUTO: 36.4 % (ref 34.8–46.1)
HGB BLD-MCNC: 12 G/DL (ref 11.5–15.4)
MCH RBC QN AUTO: 32.3 PG (ref 26.8–34.3)
MCHC RBC AUTO-ENTMCNC: 33 G/DL (ref 31.4–37.4)
MCV RBC AUTO: 98 FL (ref 82–98)
PLATELET # BLD AUTO: 185 THOUSANDS/UL (ref 149–390)
PMV BLD AUTO: 10.6 FL (ref 8.9–12.7)
RBC # BLD AUTO: 3.72 MILLION/UL (ref 3.81–5.12)
WBC # BLD AUTO: 9.75 THOUSAND/UL (ref 4.31–10.16)

## 2024-10-16 PROCEDURE — 85027 COMPLETE CBC AUTOMATED: CPT

## 2024-10-16 PROCEDURE — 36415 COLL VENOUS BLD VENIPUNCTURE: CPT

## 2024-10-16 PROCEDURE — 82950 GLUCOSE TEST: CPT

## 2024-10-16 PROCEDURE — 86780 TREPONEMA PALLIDUM: CPT

## 2024-10-17 LAB — TREPONEMA PALLIDUM IGG+IGM AB [PRESENCE] IN SERUM OR PLASMA BY IMMUNOASSAY: NORMAL

## 2024-10-22 NOTE — PROGRESS NOTES
VISIT 29 yr old  at 29w2d: (+) cramping - mild/ irregular tightening; Denies n/v/HA/vb/lof/edema/dv/smoking; urine neg/neg  Labs up to date; PNC - no further ultrasounds scheduled;   PNVs + DHA - tolerating daily  Good FM - r/monika 10 kicks/2 hrs  Tdap - reviewed and encouraged - administered today without issue;     Breast pump - has at home; Peds - referral placed;  Yellow folder given and reviewed; Delivery Care Consent reviewed and signed   Encouraged hydration. Reviewed signs and symptoms of labor and preE and when to call  Encouraged the flu vaccine and COVID booster in pregnancy; Encouraged infection prevention/handwashing. Will plan flu vac next visit  RTO in 2 weeks for routine ob check or sooner if needed

## 2024-10-23 ENCOUNTER — ROUTINE PRENATAL (OUTPATIENT)
Dept: OBGYN CLINIC | Facility: CLINIC | Age: 29
End: 2024-10-23
Payer: COMMERCIAL

## 2024-10-23 VITALS
SYSTOLIC BLOOD PRESSURE: 100 MMHG | WEIGHT: 160 LBS | BODY MASS INDEX: 27.31 KG/M2 | DIASTOLIC BLOOD PRESSURE: 66 MMHG | HEIGHT: 64 IN

## 2024-10-23 DIAGNOSIS — Z23 NEED FOR TDAP VACCINATION: ICD-10-CM

## 2024-10-23 DIAGNOSIS — Z23 ENCOUNTER FOR IMMUNIZATION: ICD-10-CM

## 2024-10-23 DIAGNOSIS — Z34.03 ENCOUNTER FOR SUPERVISION OF NORMAL FIRST PREGNANCY IN THIRD TRIMESTER: Primary | ICD-10-CM

## 2024-10-23 PROCEDURE — 90715 TDAP VACCINE 7 YRS/> IM: CPT | Performed by: PHYSICIAN ASSISTANT

## 2024-10-23 PROCEDURE — PNV: Performed by: PHYSICIAN ASSISTANT

## 2024-10-23 PROCEDURE — 90471 IMMUNIZATION ADMIN: CPT | Performed by: PHYSICIAN ASSISTANT

## 2024-10-31 ENCOUNTER — TELEPHONE (OUTPATIENT)
Dept: OBGYN CLINIC | Facility: CLINIC | Age: 29
End: 2024-10-31

## 2024-11-01 NOTE — TELEPHONE ENCOUNTER
Called the patient for a 3rd  trimester follow up call,left  a voice message to return the call   
Called the patient for a 3rd trimester call, left  a voice message to return the call   
Patient returned call, attempted to contact Silvia status reflects away.     Please reach out to pt for 3rd trimester check in  
Speaking Coherently

## 2024-11-06 ENCOUNTER — ROUTINE PRENATAL (OUTPATIENT)
Dept: OBGYN CLINIC | Facility: CLINIC | Age: 29
End: 2024-11-06
Payer: COMMERCIAL

## 2024-11-06 ENCOUNTER — CLINICAL SUPPORT (OUTPATIENT)
Dept: POSTPARTUM | Facility: CLINIC | Age: 29
End: 2024-11-06

## 2024-11-06 VITALS — BODY MASS INDEX: 27.29 KG/M2 | WEIGHT: 159 LBS | SYSTOLIC BLOOD PRESSURE: 116 MMHG | DIASTOLIC BLOOD PRESSURE: 72 MMHG

## 2024-11-06 DIAGNOSIS — Z32.2 ENCOUNTER FOR CHILDBIRTH INSTRUCTION: Primary | ICD-10-CM

## 2024-11-06 DIAGNOSIS — Z23 NEED FOR INFLUENZA VACCINATION: ICD-10-CM

## 2024-11-06 DIAGNOSIS — Z34.93 PRENATAL CARE IN THIRD TRIMESTER: Primary | ICD-10-CM

## 2024-11-06 PROCEDURE — PNV: Performed by: OBSTETRICS & GYNECOLOGY

## 2024-11-06 PROCEDURE — 90471 IMMUNIZATION ADMIN: CPT | Performed by: OBSTETRICS & GYNECOLOGY

## 2024-11-06 PROCEDURE — 90656 IIV3 VACC NO PRSV 0.5 ML IM: CPT | Performed by: OBSTETRICS & GYNECOLOGY

## 2024-11-06 NOTE — PROGRESS NOTES
Patient reports good fm, no n/v, headache, bleeding, loss of fluid, edema, dom violence, or smoking.  will pnv urine neg/neg  -had tdap, Flu shot today,  -Already has breast pump, yellow folder, pediatrician referral  Would like position check later in pregnancy  -Return in 2 weeks or sooner as needed

## 2024-11-13 ENCOUNTER — CLINICAL SUPPORT (OUTPATIENT)
Dept: POSTPARTUM | Facility: CLINIC | Age: 29
End: 2024-11-13

## 2024-11-13 DIAGNOSIS — Z32.2 ENCOUNTER FOR CHILDBIRTH INSTRUCTION: Primary | ICD-10-CM

## 2024-11-17 NOTE — PROGRESS NOTES
OB/GYN  PN Visit  Ya Montez  [id number]  2024  11:12 AM  Dr. Katrina Corrales MD    S: 29 y.o.  33w1d here for PN visit. She denies contractions. She denies leakage of fluid and vaginal bleeding. She reports good fetal movement. She denies nausea, vomiting, edema, domestic violence, and smoking. She reports occasional headaches for which she takes Tylenol. She reports occasional light cramping. Her pregnancy is complicated by right ovarian cyst.     O:  Pre- Vitals      Flowsheet Row Most Recent Value   Prenatal Assessment    Fetal Heart Rate 141   Fundal Height (cm) 33 cm   Movement Present   Prenatal Vitals    Blood Pressure 102/78   Weight - Scale 74.4 kg (164 lb)   Urine Albumin/Glucose    Dilation/Effacement/Station    Vaginal Drainage    Draining Fluid No   Edema    LLE Edema None   RLE Edema None          Physical Exam  Vitals reviewed.   Constitutional:       General: She is not in acute distress.     Appearance: Normal appearance. She is well-developed. She is not ill-appearing, toxic-appearing or diaphoretic.   Cardiovascular:      Rate and Rhythm: Normal rate.   Pulmonary:      Effort: Pulmonary effort is normal. No respiratory distress.   Abdominal:      General: There is no distension.      Palpations: Abdomen is soft. There is no mass.      Tenderness: There is no abdominal tenderness. There is no guarding or rebound.   Genitourinary:     Comments: Gravid, nontender  Skin:     General: Skin is warm and dry.   Neurological:      Mental Status: She is alert and oriented to person, place, and time.   Psychiatric:         Mood and Affect: Mood normal.         Behavior: Behavior normal.         A/P:    Assessment & Plan  33 weeks gestation of pregnancy  - Continue PNV  - Labor precautions reviewed  - Fetal kick counts reviewed  - Labs: UTD  - Ultrasounds: Level II WNL on 24; 3rd trimester growth scan requested (referral placed). Will need position check 36wks (?)  - Tdap:  Administered 10/23/24  - Flu Shot: Administered 24   - RSV: Administered today  - COVID: Vaccinated; booster encouraged  - Rhogam: N/A  - Delivery:  with epidural  - Contraception: Declined/ Undecided   - Breastfeeding: Yes; Has pump  - Pediatrician: Referred previously  - RTO in 2 weeks  Orders:    Ambulatory Referral to Maternal Fetal Medicine; Future    Ovarian cyst affecting pregnancy in third trimester, antepartum  Right ovarian cystic mass. The cyst measured 3.79 x 1.21 x 1.21 cm, complex in appearance with a smooth lining. Sonolucent fluid. Color doppler flow was performed.           Future Appointments   Date Time Provider Department Center   2024  6:00 PM PREPARED CHILDBIRTH BE OPTION 2 (WEEK 3) [facility]   2024  6:00 PM [facility]   2024  4:30 PM Kori Benson MD [facility] [place] [facility]   12/10/2024  8:30 AM Minna Santillan PA-C [facility]   2024  6:00 PM [facility] NEW MOMS BE [facility]   2024  6:00 PM PRENATAL BREASTFEEDING BE [facility]   2024  8:15 AM Jim Mendez PA-C [facility]   2024  2:45 PM Kori Benson MD [facility] [place] [facility]   2024  2:00 PM Katrina Corrales MD [facility] [place] [facility]   2025  8:30 AM Tiesha Kerr MD [facility] [place] WO BE Practice-Wo         Katrina Corrales MD  2024  11:12 AM

## 2024-11-19 ENCOUNTER — ROUTINE PRENATAL (OUTPATIENT)
Dept: OBGYN CLINIC | Facility: CLINIC | Age: 29
End: 2024-11-19
Payer: COMMERCIAL

## 2024-11-19 VITALS
SYSTOLIC BLOOD PRESSURE: 102 MMHG | WEIGHT: 164 LBS | DIASTOLIC BLOOD PRESSURE: 78 MMHG | HEIGHT: 64 IN | BODY MASS INDEX: 28 KG/M2

## 2024-11-19 DIAGNOSIS — Z23 ENCOUNTER FOR IMMUNIZATION: ICD-10-CM

## 2024-11-19 DIAGNOSIS — Z3A.33 33 WEEKS GESTATION OF PREGNANCY: ICD-10-CM

## 2024-11-19 DIAGNOSIS — O34.83 OVARIAN CYST AFFECTING PREGNANCY IN THIRD TRIMESTER, ANTEPARTUM: Primary | ICD-10-CM

## 2024-11-19 DIAGNOSIS — N83.209 OVARIAN CYST AFFECTING PREGNANCY IN THIRD TRIMESTER, ANTEPARTUM: Primary | ICD-10-CM

## 2024-11-19 PROCEDURE — 90471 IMMUNIZATION ADMIN: CPT | Performed by: OBSTETRICS & GYNECOLOGY

## 2024-11-19 PROCEDURE — 90678 RSV VACC PREF BIVALENT IM: CPT | Performed by: OBSTETRICS & GYNECOLOGY

## 2024-11-19 PROCEDURE — PNV: Performed by: OBSTETRICS & GYNECOLOGY

## 2024-11-19 NOTE — ASSESSMENT & PLAN NOTE
Right ovarian cystic mass. The cyst measured 3.79 x 1.21 x 1.21 cm, complex in appearance with a smooth lining. Sonolucent fluid. Color doppler flow was performed.

## 2024-11-20 ENCOUNTER — CLINICAL SUPPORT (OUTPATIENT)
Dept: POSTPARTUM | Facility: CLINIC | Age: 29
End: 2024-11-20

## 2024-11-20 DIAGNOSIS — Z32.2 ENCOUNTER FOR CHILDBIRTH INSTRUCTION: Primary | ICD-10-CM

## 2024-12-02 ENCOUNTER — CLINICAL SUPPORT (OUTPATIENT)
Age: 29
End: 2024-12-02

## 2024-12-02 DIAGNOSIS — Z32.2 ENCOUNTER FOR CHILDBIRTH INSTRUCTION: Primary | ICD-10-CM

## 2024-12-02 PROBLEM — Z3A.35 35 WEEKS GESTATION OF PREGNANCY: Status: ACTIVE | Noted: 2024-12-02

## 2024-12-03 ENCOUNTER — ULTRASOUND (OUTPATIENT)
Dept: PERINATAL CARE | Facility: OTHER | Age: 29
End: 2024-12-03
Payer: COMMERCIAL

## 2024-12-03 VITALS
BODY MASS INDEX: 28.17 KG/M2 | WEIGHT: 165 LBS | SYSTOLIC BLOOD PRESSURE: 110 MMHG | HEIGHT: 64 IN | HEART RATE: 81 BPM | DIASTOLIC BLOOD PRESSURE: 66 MMHG

## 2024-12-03 DIAGNOSIS — Z3A.33 33 WEEKS GESTATION OF PREGNANCY: ICD-10-CM

## 2024-12-03 DIAGNOSIS — N83.209 OVARIAN CYST AFFECTING PREGNANCY IN THIRD TRIMESTER, ANTEPARTUM: ICD-10-CM

## 2024-12-03 DIAGNOSIS — O34.83 OVARIAN CYST AFFECTING PREGNANCY IN THIRD TRIMESTER, ANTEPARTUM: ICD-10-CM

## 2024-12-03 DIAGNOSIS — Z36.89 ENCOUNTER FOR ULTRASOUND TO ASSESS FETAL GROWTH: ICD-10-CM

## 2024-12-03 DIAGNOSIS — Z3A.35 35 WEEKS GESTATION OF PREGNANCY: Primary | ICD-10-CM

## 2024-12-03 PROCEDURE — 76816 OB US FOLLOW-UP PER FETUS: CPT | Performed by: OBSTETRICS & GYNECOLOGY

## 2024-12-03 PROCEDURE — 99212 OFFICE O/P EST SF 10 MIN: CPT | Performed by: OBSTETRICS & GYNECOLOGY

## 2024-12-03 NOTE — PROGRESS NOTES
"[facility]: Ms. Montez was seen today at 35w1d gestational age for fetal growth assessment ultrasound.  See ultrasound report under \"OB Procedures\" tab.    Jenny GAY    I spent 10 minutes devoted to patient care (4 min chart preparation, 3 minutes face to face and 4  minutes documenting).    "

## 2024-12-03 NOTE — LETTER
"   Date: 12/3/2024    Kori Benson MD  [address] [facility] [address]    Patient: Ya Montez   YOB: 1995   Date of Visit: 12/3/2024   Gestational age 35w1d   Nature of this communication: Routine       This patient was seen recently in our  office.  Please see ultrasound report under \"OB Procedures\" tab.  Please don't hesitate to contact our office with any concerns or questions.      Sincerely,      Kiya Deleon MD  Attending Physician, Maternal-Fetal Medicine  [facility]    "

## 2024-12-03 NOTE — PROGRESS NOTES
"[facility]: Ya Montez was seen today at 35w1d for fetal growth assessment ultrasound.  See ultrasound report under \"OB Procedures\" tab.  Please don't hesitate to contact our office with any concerns or questions.  -Kiya Deleon MD    "

## 2024-12-05 ENCOUNTER — HOSPITAL ENCOUNTER (OUTPATIENT)
Facility: HOSPITAL | Age: 29
Discharge: HOME | End: 2024-12-05
Attending: OBSTETRICS & GYNECOLOGY | Admitting: OBSTETRICS & GYNECOLOGY
Payer: COMMERCIAL

## 2024-12-05 ENCOUNTER — ROUTINE PRENATAL (OUTPATIENT)
Dept: OBGYN CLINIC | Facility: CLINIC | Age: 29
End: 2024-12-05

## 2024-12-05 VITALS
DIASTOLIC BLOOD PRESSURE: 75 MMHG | OXYGEN SATURATION: 99 % | RESPIRATION RATE: 16 BRPM | TEMPERATURE: 97.2 F | SYSTOLIC BLOOD PRESSURE: 120 MMHG | HEART RATE: 76 BPM

## 2024-12-05 VITALS
DIASTOLIC BLOOD PRESSURE: 78 MMHG | HEIGHT: 64 IN | WEIGHT: 166 LBS | BODY MASS INDEX: 28.34 KG/M2 | SYSTOLIC BLOOD PRESSURE: 110 MMHG

## 2024-12-05 DIAGNOSIS — Z34.93 PRENATAL CARE IN THIRD TRIMESTER: Primary | ICD-10-CM

## 2024-12-05 PROBLEM — O36.8390 ANTEPARTUM NON-REASSURING FETAL HEART RATE OR RHYTHM AFFECTING CARE OF MOTHER: Status: ACTIVE | Noted: 2024-12-05

## 2024-12-05 PROCEDURE — 59025 FETAL NON-STRESS TEST: CPT | Performed by: OBSTETRICS & GYNECOLOGY

## 2024-12-05 PROCEDURE — 99214 OFFICE O/P EST MOD 30 MIN: CPT

## 2024-12-05 PROCEDURE — PNV: Performed by: STUDENT IN AN ORGANIZED HEALTH CARE EDUCATION/TRAINING PROGRAM

## 2024-12-05 PROCEDURE — G0463 HOSPITAL OUTPT CLINIC VISIT: HCPCS

## 2024-12-05 RX ORDER — SODIUM CHLORIDE FOR INHALATION 0.9 %
VIAL, NEBULIZER (ML) INHALATION
Status: DISCONTINUED
Start: 2024-12-05 | End: 2024-12-05 | Stop reason: HOSPADM

## 2024-12-05 NOTE — PROGRESS NOTES
L&D Triage Note - OB/GYN  Ya Montez 29 y.o. female MRN: [id number]  Unit/Bed#: LD TRIAGE  Encounter: [id number]    Patient is seen by CFW    ASSESSMENT  Ya Montez is a 29 y.o.  at 35w3d who presented for extended monitoring due to -110 at routine prenatal visit. She is stable with fetal status overall reassuring at this time.    * Antepartum non-reassuring fetal heart rate or rhythm affecting care of mother  Assessment & Plan  Presented to triage for fetal heart rate of 100-110 bpm at routine prenatal visit. S/p extended fetal monitoring >1hr in triage with reassuring FHT  - continue routine prenatal care  - return precautions given    35 weeks gestation of pregnancy  Assessment & Plan  No obstetric complains. No contractions on toco  - continue routine prenatal care  - return precautions given      #Discharge instructions  - patient instructed to call/return if experiencing worsening contractions, vaginal bleeding, loss of fluid, or decreased fetal movement  - next OB appointment: 12/10/24    Patient discussed with attending physician Dr. Corrales  ______________    SUBJECTIVE  ABDIRIZAK: Estimated Date of Delivery: 25  HPI:  29 y.o.  @35w3d with history of migraine presents from clinic for -110 bpm. This pregnancy is complicated by ovarian cyst.     She denies contractions, vaginal bleeding, leakage of fluid, or decreased fetal movement.    OBJECTIVE:  Vitals:  /75   Pulse 76   Temp (!) 97.2 °F (36.2 °C) (Temporal)   Resp 16   LMP 2024 (Exact Date)   SpO2 99%   There is no height or weight on file to calculate BMI.    Physical Exam  Vitals reviewed.   Constitutional:       General: She is not in acute distress.     Appearance: She is well-developed.   HENT:      Head: Normocephalic and atraumatic.   Cardiovascular:      Rate and Rhythm: Normal rate.   Pulmonary:      Effort: Pulmonary effort is normal. No respiratory distress.   Skin:     Findings: No  rash (on exposed skin).   Neurological:      Mental Status: She is alert and oriented to person, place, and time.   Psychiatric:         Mood and Affect: Affect normal.         Speech: Speech normal.         Behavior: Behavior normal.       FHT:  Baseline Rate (FHR): 120 bpm  Variability: Moderate  Accelerations: 15 x 15 or greater, With fetal movement  Decelerations: None    TOCO:   Contraction Frequency (minutes): 0  Contraction Duration (seconds): NA    IMAGING:  TAUS   ARIANA      - Q1 3.95 cm     - Q2 2.18 cm     - Q3 1.12 cm     - Q4 0.75 cm     - Total: 8.00 cm   Placenta: posterior            Labs: No results found for this or any previous visit (from the past 24 hours).    William Jerry MD  OBGYN PGY-1  12/06/24  6:54 AM

## 2024-12-05 NOTE — PROGRESS NOTES
Ya is a 29-year-old G1, P0 at 35 weeks and 3 days presenting for routine prenatal care-she denies contractions, loss of fluid or vaginal bleeding.  She endorses good fetal movement.  Urine with trace protein/negative glucose.  Fetal HR on doppler today 100-110s- recommended patient present to L&D for monitoring and ARIANA which she is amendable to.  Growth u/s from 12/3/2024 reviewed- EFW 27 %ile, vertex, posterior placenta.  RTO for GBS in 1 week

## 2024-12-06 PROBLEM — O36.8390 ANTEPARTUM NON-REASSURING FETAL HEART RATE OR RHYTHM AFFECTING CARE OF MOTHER: Chronic | Status: ACTIVE | Noted: 2024-12-05

## 2024-12-06 NOTE — DISCHARGE INSTR - AVS FIRST PAGE
Please return if you experience any of the following:  - strong or regular contractions  - vaginal bleeding  - loss of fluid from the vagina  - decreased fetal movement

## 2024-12-06 NOTE — ASSESSMENT & PLAN NOTE
No obstetric complains. No contractions on toco  - continue routine prenatal care  - return precautions given

## 2024-12-06 NOTE — ASSESSMENT & PLAN NOTE
Presented to triage for fetal heart rate of 100-110 bpm at routine prenatal visit. S/p extended fetal monitoring >1hr in triage with reassuring FHT  - continue routine prenatal care  - return precautions given

## 2024-12-09 PROBLEM — Z3A.36 36 WEEKS GESTATION OF PREGNANCY: Status: ACTIVE | Noted: 2024-12-02

## 2024-12-09 NOTE — PROGRESS NOTES
OB/GYN  PN Visit  Ya Montez  [id number]  12/10/2024  8:46 AM  Minna Santillan PA-C    S: 29 y.o.  36w0d here for PN visit. Pregnancy complicated by anxiety.     OB complaints:  She does note increased headaches over the last week. Had some relief w Tylenol. We reviewed s/sx pre E to call with. Denies current headache.  BP looks good today as well. Will consider obtaining BP cuff at home.     She reports good FM.    Had check out last week w low FHR. All normal. Good FKCs since.     O:    Pre- Vitals    Flowsheet Row Most Recent Value   Prenatal Assessment    Fetal Heart Rate 139   Fundal Height (cm) 36 cm   Movement Present   Prenatal Vitals    Blood Pressure 116/76   Weight - Scale 74.6 kg (164 lb 6.4 oz)   Urine Albumin/Glucose    Dilation/Effacement/Station    Vaginal Drainage    Draining Fluid No   Edema    LLE Edema None   RLE Edema None   Facial Edema None            Gen: no acute distress, nonlabored breathing.  OB exam completed: fundal height, +FHT.  Urine: -/-  GBS done today    A/P:    1. 36 weeks gestation of pregnancy  Assessment & Plan:  Overview:     Labs   Pap smear: 24      GC/CT:   Prenatal Panel: normal   Blood Type: A+ RhoGam not indicated   GBS: done today    Genetic Testin/24 negative,     Vaccines:  Tdap: 10/23/24 negative   Flu: 24   RSV: 24    Birth Plan:   Yellow packet given and consents signed previously.   Feeding Plan: breast  Breast pump: has one at home  Pediatrician: still decided  Contraception: condoms/withdrawl  Ultrasounds: 12/3/24 normal fetal growth and anatomy,. EFW 5lb5oz 27th%     Orders:  -     Strep B DNA probe, amplification      #1. 36w0d GESTATION  Labor precautions reviewed  Fetal kick counts reviewed    RTC in 1 weeks    Minna Santillan PA-C  12/10/2024  8:46 AM

## 2024-12-10 ENCOUNTER — ROUTINE PRENATAL (OUTPATIENT)
Dept: OBGYN CLINIC | Facility: CLINIC | Age: 29
End: 2024-12-10

## 2024-12-10 VITALS — WEIGHT: 164.4 LBS | SYSTOLIC BLOOD PRESSURE: 116 MMHG | BODY MASS INDEX: 28.22 KG/M2 | DIASTOLIC BLOOD PRESSURE: 76 MMHG

## 2024-12-10 DIAGNOSIS — Z3A.36 36 WEEKS GESTATION OF PREGNANCY: Primary | ICD-10-CM

## 2024-12-10 PROCEDURE — 87150 DNA/RNA AMPLIFIED PROBE: CPT | Performed by: PHYSICIAN ASSISTANT

## 2024-12-10 PROCEDURE — PNV: Performed by: PHYSICIAN ASSISTANT

## 2024-12-10 NOTE — ASSESSMENT & PLAN NOTE
Overview:     Labs   Pap smear: 24      GC/CT:   Prenatal Panel: normal   Blood Type: A+ RhoGam not indicated   GBS: done today    Genetic Testin/24 negative,     Vaccines:  Tdap: 10/23/24 negative   Flu: 24   RSV: 24    Birth Plan:   Yellow packet given and consents signed previously.   Feeding Plan: breast  Breast pump: has one at home  Pediatrician: still decided  Contraception: condoms/withdrawl  Ultrasounds: 12/3/24 normal fetal growth and anatomy,. EFW 5lb5oz 27th%

## 2024-12-12 LAB — GP B STREP DNA SPEC QL NAA+PROBE: NEGATIVE

## 2024-12-13 ENCOUNTER — ANESTHESIA EVENT (INPATIENT)
Dept: ANESTHESIOLOGY | Facility: HOSPITAL | Age: 29
End: 2024-12-13
Payer: COMMERCIAL

## 2024-12-13 ENCOUNTER — HOSPITAL ENCOUNTER (INPATIENT)
Facility: HOSPITAL | Age: 29
LOS: 3 days | Discharge: HOME | End: 2024-12-16
Attending: STUDENT IN AN ORGANIZED HEALTH CARE EDUCATION/TRAINING PROGRAM | Admitting: STUDENT IN AN ORGANIZED HEALTH CARE EDUCATION/TRAINING PROGRAM
Payer: COMMERCIAL

## 2024-12-13 ENCOUNTER — ANESTHESIA (INPATIENT)
Dept: ANESTHESIOLOGY | Facility: HOSPITAL | Age: 29
End: 2024-12-13
Payer: COMMERCIAL

## 2024-12-13 ENCOUNTER — NURSE TRIAGE (OUTPATIENT)
Age: 29
End: 2024-12-13

## 2024-12-13 DIAGNOSIS — Z78.9 EXCLUSIVE BREASTFEEDING BY MOTHER: ICD-10-CM

## 2024-12-13 DIAGNOSIS — O42.919 PRETERM PREMATURE RUPTURE OF MEMBRANES (PPROM) WITH UNKNOWN ONSET OF LABOR: ICD-10-CM

## 2024-12-13 PROBLEM — R03.0 ELEVATED BLOOD PRESSURE READING IN OFFICE WITHOUT DIAGNOSIS OF HYPERTENSION: Status: ACTIVE | Noted: 2024-12-13

## 2024-12-13 LAB
ABO GROUP BLD: NORMAL
ALBUMIN SERPL BCG-MCNC: 3.9 G/DL (ref 3.5–5)
ALP SERPL-CCNC: 171 U/L (ref 34–104)
ALT SERPL W P-5'-P-CCNC: 11 U/L (ref 7–52)
ANION GAP SERPL CALCULATED.3IONS-SCNC: 9 MMOL/L (ref 4–13)
AST SERPL W P-5'-P-CCNC: 17 U/L (ref 13–39)
BILIRUB SERPL-MCNC: 0.33 MG/DL (ref 0.2–1)
BLD GP AB SCN SERPL QL: NEGATIVE
BUN SERPL-MCNC: 8 MG/DL (ref 5–25)
CALCIUM SERPL-MCNC: 9.5 MG/DL (ref 8.4–10.2)
CHLORIDE SERPL-SCNC: 104 MMOL/L (ref 96–108)
CO2 SERPL-SCNC: 24 MMOL/L (ref 21–32)
CREAT SERPL-MCNC: 0.58 MG/DL (ref 0.6–1.3)
CREAT UR-MCNC: 70.4 MG/DL
ERYTHROCYTE [DISTWIDTH] IN BLOOD BY AUTOMATED COUNT: 13.1 % (ref 11.6–15.1)
GFR SERPL CREATININE-BSD FRML MDRD: 126 ML/MIN/1.73SQ M
GLUCOSE SERPL-MCNC: 75 MG/DL (ref 65–140)
HCT VFR BLD AUTO: 40.9 % (ref 34.8–46.1)
HGB BLD-MCNC: 14 G/DL (ref 11.5–15.4)
HOLD SPECIMEN: NORMAL
MCH RBC QN AUTO: 32.6 PG (ref 26.8–34.3)
MCHC RBC AUTO-ENTMCNC: 34.2 G/DL (ref 31.4–37.4)
MCV RBC AUTO: 95 FL (ref 82–98)
PLATELET # BLD AUTO: 209 THOUSANDS/UL (ref 149–390)
PMV BLD AUTO: 11 FL (ref 8.9–12.7)
POTASSIUM SERPL-SCNC: 3.7 MMOL/L (ref 3.5–5.3)
PROT SERPL-MCNC: 7 G/DL (ref 6.4–8.4)
PROT UR-MCNC: 19.5 MG/DL
PROT/CREAT UR: 0.3 MG/G{CREAT} (ref 0–0.1)
RBC # BLD AUTO: 4.3 MILLION/UL (ref 3.81–5.12)
RH BLD: POSITIVE
SODIUM SERPL-SCNC: 137 MMOL/L (ref 135–147)
SPECIMEN EXPIRATION DATE: NORMAL
TREPONEMA PALLIDUM IGG+IGM AB [PRESENCE] IN SERUM OR PLASMA BY IMMUNOASSAY: NORMAL
WBC # BLD AUTO: 11.12 THOUSAND/UL (ref 4.31–10.16)

## 2024-12-13 PROCEDURE — 86900 BLOOD TYPING SEROLOGIC ABO: CPT | Performed by: STUDENT IN AN ORGANIZED HEALTH CARE EDUCATION/TRAINING PROGRAM

## 2024-12-13 PROCEDURE — 99214 OFFICE O/P EST MOD 30 MIN: CPT

## 2024-12-13 PROCEDURE — 82570 ASSAY OF URINE CREATININE: CPT

## 2024-12-13 PROCEDURE — 84156 ASSAY OF PROTEIN URINE: CPT

## 2024-12-13 PROCEDURE — 86780 TREPONEMA PALLIDUM: CPT

## 2024-12-13 PROCEDURE — 86850 RBC ANTIBODY SCREEN: CPT | Performed by: STUDENT IN AN ORGANIZED HEALTH CARE EDUCATION/TRAINING PROGRAM

## 2024-12-13 PROCEDURE — NC001 PR NO CHARGE: Performed by: STUDENT IN AN ORGANIZED HEALTH CARE EDUCATION/TRAINING PROGRAM

## 2024-12-13 PROCEDURE — 86901 BLOOD TYPING SEROLOGIC RH(D): CPT | Performed by: STUDENT IN AN ORGANIZED HEALTH CARE EDUCATION/TRAINING PROGRAM

## 2024-12-13 PROCEDURE — 80053 COMPREHEN METABOLIC PANEL: CPT

## 2024-12-13 PROCEDURE — 85027 COMPLETE CBC AUTOMATED: CPT

## 2024-12-13 PROCEDURE — G0463 HOSPITAL OUTPT CLINIC VISIT: HCPCS

## 2024-12-13 RX ORDER — ONDANSETRON 2 MG/ML
4 INJECTION INTRAMUSCULAR; INTRAVENOUS EVERY 6 HOURS PRN
Status: DISCONTINUED | OUTPATIENT
Start: 2024-12-13 | End: 2024-12-14

## 2024-12-13 RX ORDER — SODIUM CHLORIDE, SODIUM LACTATE, POTASSIUM CHLORIDE, CALCIUM CHLORIDE 600; 310; 30; 20 MG/100ML; MG/100ML; MG/100ML; MG/100ML
125 INJECTION, SOLUTION INTRAVENOUS CONTINUOUS
Status: DISCONTINUED | OUTPATIENT
Start: 2024-12-13 | End: 2024-12-14

## 2024-12-13 RX ORDER — ROPIVACAINE HYDROCHLORIDE 2 MG/ML
INJECTION, SOLUTION EPIDURAL; INFILTRATION; PERINEURAL AS NEEDED
Status: DISCONTINUED | OUTPATIENT
Start: 2024-12-13 | End: 2024-12-17 | Stop reason: HOSPADM

## 2024-12-13 RX ORDER — LIDOCAINE HYDROCHLORIDE AND EPINEPHRINE 15; 5 MG/ML; UG/ML
INJECTION, SOLUTION EPIDURAL AS NEEDED
Status: DISCONTINUED | OUTPATIENT
Start: 2024-12-13 | End: 2024-12-17 | Stop reason: HOSPADM

## 2024-12-13 RX ORDER — LIDOCAINE HYDROCHLORIDE 10 MG/ML
INJECTION, SOLUTION EPIDURAL; INFILTRATION; INTRACAUDAL; PERINEURAL AS NEEDED
Status: DISCONTINUED | OUTPATIENT
Start: 2024-12-13 | End: 2024-12-17 | Stop reason: HOSPADM

## 2024-12-13 RX ORDER — BUPIVACAINE HYDROCHLORIDE 2.5 MG/ML
30 INJECTION, SOLUTION EPIDURAL; INFILTRATION; INTRACAUDAL ONCE AS NEEDED
Status: DISCONTINUED | OUTPATIENT
Start: 2024-12-13 | End: 2024-12-14

## 2024-12-13 RX ADMIN — SODIUM CHLORIDE, SODIUM LACTATE, POTASSIUM CHLORIDE, AND CALCIUM CHLORIDE 125 ML/HR: .6; .31; .03; .02 INJECTION, SOLUTION INTRAVENOUS at 23:22

## 2024-12-13 RX ADMIN — ROPIVACAINE HYDROCHLORIDE: 2 INJECTION, SOLUTION EPIDURAL; INFILTRATION at 19:20

## 2024-12-13 RX ADMIN — SODIUM CHLORIDE, SODIUM LACTATE, POTASSIUM CHLORIDE, AND CALCIUM CHLORIDE 125 ML/HR: .6; .31; .03; .02 INJECTION, SOLUTION INTRAVENOUS at 14:55

## 2024-12-13 RX ADMIN — LIDOCAINE HYDROCHLORIDE AND EPINEPHRINE 3 ML: 15; 5 INJECTION, SOLUTION EPIDURAL at 19:12

## 2024-12-13 RX ADMIN — LIDOCAINE HYDROCHLORIDE AND EPINEPHRINE 2 ML: 15; 5 INJECTION, SOLUTION EPIDURAL at 19:15

## 2024-12-13 RX ADMIN — Medication 50 MCG: at 14:54

## 2024-12-13 RX ADMIN — SODIUM CHLORIDE, SODIUM LACTATE, POTASSIUM CHLORIDE, AND CALCIUM CHLORIDE 125 ML/HR: .6; .31; .03; .02 INJECTION, SOLUTION INTRAVENOUS at 18:30

## 2024-12-13 RX ADMIN — ROPIVACAINE HYDROCHLORIDE 5 ML: 2 INJECTION, SOLUTION EPIDURAL; INFILTRATION at 19:16

## 2024-12-13 RX ADMIN — LIDOCAINE HYDROCHLORIDE 3 ML: 10 INJECTION, SOLUTION EPIDURAL; INFILTRATION; INTRACAUDAL; PERINEURAL at 19:04

## 2024-12-13 NOTE — OB LABOR/OXYTOCIN SAFETY PROGRESS
Labor Progress Note - Ya Montez 29 y.o. female MRN: [id number]    Unit/Bed#: -01 Encounter: [id number]       Contraction Frequency (minutes): 2-4  Contraction Intensity: Mild  Uterine Activity Characteristics: Irritability  Cervical Dilation: 2        Cervical Effacement: 80  Fetal Station: -2  Baseline Rate (FHR): 120 bpm  Fetal Heart Rate (FHT): 115 BPM  FHR Category: i               Vital Signs:   Vitals:    12/13/24 1735   BP: 135/90   Pulse: 81   Resp:    Temp:        Notes/comments:   Feeling much more uncomfortable  SVE 2/80/-2  Contractions palpate mod-strong and every 2-3 minutes  Will reassess in a couple of hours, in unchanged will start pitocin  Sooner if contractions space  May desire PCEA soon        Denisse Rodriguez MD 12/13/2024 6:10 PM

## 2024-12-13 NOTE — H&P
H & P- Obstetrics   Ya Montez 29 y.o. female MRN: [id number]  Unit/Bed#: LD TRIAGE 2- Encounter: [id number]    Assessment: 29 y.o.  at 36w4d admitted for  premature rupture of membranes.  SVE: 0.5/50/-4  FHT: cat 1  Clinical EFW: 27% ; Cephalic confirmed by TAUS  GBS status: neg     Plan:   *  premature rupture of membranes (PPROM) with unknown onset of labor  Assessment & Plan  Admit to OBGYN   Clear liquid diet   F/u T&S, CBC, RPR   IVF LR 125cc/hr   Continuous fetal monitoring and tocometry   Analgesia at maternal request   Vertex by TAUS  Induction plan cyto, pit      Discussed case and plan w/ Dr. Rodriguez      Chief Complaint: leaking fluid    HPI: Ya Montez is a 29 y.o.  with an ABDIRIZAK of 2025, by Ultrasound at 36w4d who is being admitted for  premature rupture of membranes. She denies having uterine contractions, has light LOF, and reports no VB. She states she has felt good FM.    Patient Active Problem List   Diagnosis    Anxiety    Ovarian cyst affecting pregnancy in third trimester, antepartum    36 weeks gestation of pregnancy    Antepartum non-reassuring fetal heart rate or rhythm affecting care of mother     premature rupture of membranes (PPROM) with unknown onset of labor       Baby complications/comments: none    Review of Systems   Constitutional:  Negative for chills and fever.   HENT:  Negative for ear pain and sore throat.    Eyes:  Negative for pain and visual disturbance.   Respiratory:  Negative for cough and shortness of breath.    Cardiovascular:  Negative for chest pain and palpitations.   Gastrointestinal:  Negative for abdominal pain and vomiting.   Genitourinary:  Negative for dysuria and hematuria.   Musculoskeletal:  Negative for arthralgias and back pain.   Skin:  Negative for color change and rash.   Neurological:  Negative for seizures and syncope.   All other systems reviewed and are negative.      OB Hx:  OB History  "   Para Term  AB Living   1    0 0   SAB IAB Ectopic Multiple Live Births   0 0 0        # Outcome Date GA Lbr Kg/2nd Weight Sex Type Anes PTL Lv   1 Current                Past Medical Hx:  Past Medical History:   Diagnosis Date    Abnormal Pap smear of cervix     Never had one    Migraine     Varicella     as a child       Past Surgical hx:  No past surgical history on file.    Allergies   Allergen Reactions    Lotrimin [Clotrimazole] Rash         Medications Prior to Admission:     Prenatal Multivit-Min-Fe-FA (PRE- PO)    Objective:  Temp:  [97.4 °F (36.3 °C)] 97.4 °F (36.3 °C)  HR:  [78] 78  BP: (122)/(81) 122/81  Resp:  [16] 16  There is no height or weight on file to calculate BMI.     Physical Exam:  Physical Exam  Constitutional:       Appearance: Normal appearance.   HENT:      Head: Atraumatic.   Eyes:      Extraocular Movements: Extraocular movements intact.      Conjunctiva/sclera: Conjunctivae normal.   Cardiovascular:      Rate and Rhythm: Normal rate and regular rhythm.      Pulses: Normal pulses.   Pulmonary:      Effort: Pulmonary effort is normal. No respiratory distress.      Breath sounds: Normal breath sounds.   Abdominal:      Palpations: Abdomen is soft.      Tenderness: There is no abdominal tenderness.   Neurological:      General: No focal deficit present.      Mental Status: She is alert and oriented to person, place, and time.   Skin:     General: Skin is warm and dry.   Psychiatric:         Mood and Affect: Mood normal.         Behavior: Behavior normal.   Vitals reviewed. Exam conducted with a chaperone present.            FHT:  Baseline Rate (FHR): 115 bpm  Variability: Moderate  Accelerations: 15 x 15 or greater, With fetal movement  Decelerations: None    TOCO:        Lab Results   Component Value Date    WBC 9.75 10/16/2024    HGB 12.0 10/16/2024    HCT 36.4 10/16/2024     10/16/2024     No results found for: \"NA\", \"K\", \"CL\", \"CO2\", \"BUN\", \"CREATININE\", " "\"GLUCOSE\", \"AST\", \"ALT\"  Prenatal Labs: Reviewed      Blood type: A pos  Antibody: neg  GBS: neg  HIV: neg  Rubella: NI  Syphilis IgM/IgG: neg  HBsAg: neg  HCAb: neg  Chlamydia: neg  Gonorrhea: neg  Diabetes 1 hour screen: neg  3 hour glucose: n/a  Platelets: 13.0  Hgb: 265  >2 Midnights  INPATIENT     Signature/Title: Tayler Valerio MD  Date: 12/13/2024  Time: 12:37 PM   "

## 2024-12-13 NOTE — ANESTHESIA PREPROCEDURE EVALUATION
Procedure:  LABOR ANALGESIA    Relevant Problems   ANESTHESIA (within normal limits)      GYN   (+) 36 weeks gestation of pregnancy      NEURO/PSYCH   (+) Anxiety      Plts 209    Physical Exam    Airway    Mallampati score: III  TM Distance: <3 FB  Neck ROM: full     Dental   No notable dental hx     Cardiovascular      Pulmonary      Other Findings  post-pubertal.      Anesthesia Plan  ASA Score- 2     Anesthesia Type- epidural with ASA Monitors.         Additional Monitors:     Airway Plan:     Comment: .epidural  .       Plan Factors-    Chart reviewed.   Existing labs reviewed.                   Induction-     Postoperative Plan-     Perioperative Resuscitation Plan - Level 1 - Full Code.       Informed Consent- Anesthetic plan and risks discussed with patient and spouse.

## 2024-12-13 NOTE — PLAN OF CARE
Problem: BIRTH - VAGINAL/ SECTION  Goal: Fetal and maternal status remain reassuring during the birth process  Description: INTERVENTIONS:  - Monitor vital signs  - Monitor fetal heart rate  - Monitor uterine activity  - Monitor labor progression (vaginal delivery)  - DVT prophylaxis  - Antibiotic prophylaxis  Outcome: Not Progressing  Goal: Emotionally satisfying birthing experience for mother/fetus  Description: Interventions:  - Assess, plan, implement and evaluate the nursing care given to the patient in labor  - Advocate the philosophy that each childbirth experience is a unique experience and support the family's chosen level of involvement and control during the labor process   - Actively participate in both the patient's and family's teaching of the birth process  - Consider cultural, Lutheran and age-specific factors and plan care for the patient in labor  Outcome: Not Progressing     Problem: Knowledge Deficit  Goal: Verbalizes understanding of labor plan  Description: Assess patient/family/caregiver's baseline knowledge level and ability to understand information.  Provide education via patient/family/caregiver's preferred learning method at appropriate level of understanding.     1. Provide teaching at level of understanding.  2. Provide teaching via preferred learning method(s).  Outcome: Not Progressing     Problem: Labor & Delivery  Goal: Manages discomfort  Description: Assess and monitor for signs and symptoms of discomfort.  Assess patient's pain level regularly and per hospital policy.  Administer medications as ordered. Support use of nonpharmacological methods to help control pain such as distraction, imagery, relaxation, and application of heat and cold.  Collaborate with interdisciplinary team and patient to determine appropriate pain management plan.    1. Include patient in decisions related to comfort.  2. Offer non-pharmacological pain management interventions.  3. Report  ineffective pain management to physician.  Outcome: Not Progressing  Goal: Patient vital signs are stable  Description: 1. Assess vital signs - vaginal delivery.  Outcome: Not Progressing     Problem: PAIN -   Goal: Displays adequate comfort level or baseline comfort level  Description: INTERVENTIONS:  - Perform pain scoring using age-appropriate tool with hands-on care as needed.  Notify physician/AP of high pain scores not responsive to comfort measures  - Administer analgesics based on type and severity of pain and evaluate response  - Sucrose analgesia per protocol for brief minor painful procedures  - Teach parents interventions for comforting infant  Outcome: Not Progressing     Problem: THERMOREGULATION - PEDIATRICS  Goal: Maintains normal body temperature  Description: Interventions:  - Monitor temperature (axillary for Newborns) as ordered  - Monitor for signs of hypothermia or hyperthermia  - Provide thermal support measures  - Wean to open crib when appropriate  Outcome: Not Progressing     Problem: INFECTION -   Goal: No evidence of infection  Description: INTERVENTIONS:  - Instruct family/visitors to use good hand hygiene technique  - Identify and instruct in appropriate isolation precautions for identified infection/condition  - Change incubator every 2 weeks or as needed.  - Monitor for symptoms of infection  - Monitor surgical sites and insertion sites for all indwelling lines, tubes, and drains for drainage, redness, or edema.  - Monitor endotracheal and nasal secretions for changes in amount and color  - Monitor culture and CBC results  - Administer antibiotics as ordered.  Monitor drug levels  Outcome: Not Progressing     Problem: RISK FOR INFECTION (RISK FACTORS FOR MATERNAL CHORIOAMNIOITIS - )  Goal: No evidence of infection  Description: INTERVENTIONS:  - Instruct family/visitors to use good hand hygiene technique  - Monitor for symptoms of infection  - Monitor culture and  CBC results  - Administer antibiotics as ordered.  Monitor drug levels  Outcome: Not Progressing     Problem: SAFETY -   Goal: Patient will remain free from falls  Description: INTERVENTIONS:  - Instruct family/caregiver on patient safety  - Keep incubator doors and portholes closed when unattended  - Keep radiant warmer side rails and crib rails up when unattended  - Based on caregiver fall risk screen, instruct family/caregiver to ask for assistance with transferring infant if caregiver noted to have fall risk factors  Outcome: Not Progressing     Problem: Knowledge Deficit  Goal: Patient/family/caregiver demonstrates understanding of disease process, treatment plan, medications, and discharge instructions  Description: Complete learning assessment and assess knowledge base.  Interventions:  - Provide teaching at level of understanding  - Provide teaching via preferred learning methods  Outcome: Not Progressing  Goal: Infant caregiver verbalizes understanding of benefits of skin-to-skin with healthy   Description: Prior to delivery, educate patient regarding skin-to-skin practice and its benefits  Initiate immediate and uninterrupted skin-to-skin contact after birth until breastfeeding is initiated or a minimum of one hour  Encourage continued skin-to-skin contact throughout the post partum stay    Outcome: Not Progressing  Goal: Infant caregiver verbalizes understanding of benefits and management of breastfeeding their healthy   Description: Help initiate breastfeeding within one hour of birth  Educate/assist with breastfeeding positioning and latch  Educate on safe positioning and to monitor their  for safety  Educate on how to maintain lactation even if they are  from their   Educate/initiate pumping for a mom with a baby in the NICU within 6 hours after birth  Give infants no food or drink other than breast milk unless medically indicated  Educate on feeding cues and  encourage breastfeeding on demand    Outcome: Not Progressing  Goal: Infant caregiver verbalizes understanding of benefits to rooming-in with their healthy   Description: Promote rooming in 23 out of 24 hours per day  Educate on benefits to rooming-in  Provide  care in room with parents as long as infant and mother condition allow    Outcome: Not Progressing  Goal: Provide formula feeding instructions and preparation information to caregivers who do not wish to breastfeed their   Description: Provide one on one information on frequency, amount, and burping for formula feeding caregivers throughout their stay and at discharge.  Provide written information/video on formula preparation.    Outcome: Not Progressing  Goal: Infant caregiver verbalizes understanding of support and resources for follow up after discharge  Description: Provide individual discharge education on when to call the doctor.  Provide resources and contact information for post-discharge support.    Outcome: Not Progressing     Problem: DISCHARGE PLANNING  Goal: Discharge to home or other facility with appropriate resources  Description: INTERVENTIONS:  - Identify barriers to discharge w/patient and caregiver  - Arrange for needed discharge resources and transportation as appropriate  - Identify discharge learning needs (meds, wound care, etc.)  - Arrange for interpretive services to assist at discharge as needed  - Refer to Case Management Department for coordinating discharge planning if the patient needs post-hospital services based on physician/advanced practitioner order or complex needs related to functional status, cognitive ability, or social support system  Outcome: Not Progressing

## 2024-12-13 NOTE — TELEPHONE ENCOUNTER
"Pt called in 36w4d  stating she woke up around 0300 to go to the bathroom. Believes she lost her mucous plug at that time. Since then has been leaking clear odorless fluid, states it has gone through two different pairs of underwear. She is wearing a pad now.  When she wipes, there is a small amount of blood. Mild cramping. Baby has been active. Advised pt be evaluated in L&D. She verbalized understanding and is in agreement to go.     ESC sent to On Call Provider Dr. Rodriguez- agrees  ESC sent to [place] L&D Charge RN       Reason for Disposition   Leakage of fluid from vagina  (Exception: Patient is uncertain, but thinks it might be urine incontinence.)    Answer Assessment - Initial Assessment Questions  1. ONSET: \"When did you notice the fluid coming out of your vagina?\"         Around 0300  2. CONTRACTIONS: \"Are you having any contractions?\" If Yes, ask: \"Describe the contractions that you are having.\" (e.g., duration, frequency, regularity, severity)      denies  3. ABDIRIZAK: \"What date are you expecting to deliver?\"      25  4. PARITY: \"Have you had a baby before?\" If Yes, ask: \"How long did the labor last?\"      denies  5. FETAL MOVEMENT: \"Has the baby's movement decreased or changed significantly from normal?\"      denies  6. OTHER SYMPTOMS: \"Do you have any other symptoms?\" (e.g., abdomen pain, fever, hand or face swelling, vaginal bleeding)      denies    Protocols used: Pregnancy - Rupture of Membranes Suspected-Adult-[place]    "

## 2024-12-14 LAB
BASE EXCESS BLDCOA CALC-SCNC: -3.5 MMOL/L (ref 3–11)
BASE EXCESS BLDCOV CALC-SCNC: -3.1 MMOL/L (ref 1–9)
HCO3 BLDCOA-SCNC: 25.4 MMOL/L (ref 17.3–27.3)
HCO3 BLDCOV-SCNC: 21.2 MMOL/L (ref 12.2–28.6)
HOLD SPECIMEN: NORMAL
O2 CT VFR BLDCOA CALC: 6.8 ML/DL
OXYHGB MFR BLDCOA: 26 %
OXYHGB MFR BLDCOV: 73.1 %
PCO2 BLDCOA: 60.3 MM[HG] (ref 30–60)
PCO2 BLDCOV: 36.3 MM HG (ref 27–43)
PH BLDCOA: 7.24 [PH] (ref 7.23–7.43)
PH BLDCOV: 7.38 [PH] (ref 7.19–7.49)
PO2 BLDCOA: 14.4 MM HG (ref 5–25)
PO2 BLDCOV: 31 MM HG (ref 15–45)
SAO2 % BLDCOV: 18.6 ML/DL

## 2024-12-14 PROCEDURE — 59400 OBSTETRICAL CARE: CPT | Performed by: STUDENT IN AN ORGANIZED HEALTH CARE EDUCATION/TRAINING PROGRAM

## 2024-12-14 PROCEDURE — 3E0DXGC INTRODUCTION OF OTHER THERAPEUTIC SUBSTANCE INTO MOUTH AND PHARYNX, EXTERNAL APPROACH: ICD-10-PCS | Performed by: STUDENT IN AN ORGANIZED HEALTH CARE EDUCATION/TRAINING PROGRAM

## 2024-12-14 PROCEDURE — 0HQ9XZZ REPAIR PERINEUM SKIN, EXTERNAL APPROACH: ICD-10-PCS | Performed by: STUDENT IN AN ORGANIZED HEALTH CARE EDUCATION/TRAINING PROGRAM

## 2024-12-14 PROCEDURE — 4A1HXCZ MONITORING OF PRODUCTS OF CONCEPTION, CARDIAC RATE, EXTERNAL APPROACH: ICD-10-PCS | Performed by: STUDENT IN AN ORGANIZED HEALTH CARE EDUCATION/TRAINING PROGRAM

## 2024-12-14 PROCEDURE — 82805 BLOOD GASES W/O2 SATURATION: CPT | Performed by: STUDENT IN AN ORGANIZED HEALTH CARE EDUCATION/TRAINING PROGRAM

## 2024-12-14 RX ORDER — ACETAMINOPHEN 325 MG/1
650 TABLET ORAL EVERY 6 HOURS
Status: DISCONTINUED | OUTPATIENT
Start: 2024-12-14 | End: 2024-12-16 | Stop reason: HOSPADM

## 2024-12-14 RX ORDER — OXYTOCIN/RINGER'S LACTATE 30/500 ML
250 PLASTIC BAG, INJECTION (ML) INTRAVENOUS ONCE
Status: COMPLETED | OUTPATIENT
Start: 2024-12-14 | End: 2024-12-14

## 2024-12-14 RX ORDER — IBUPROFEN 600 MG/1
600 TABLET, FILM COATED ORAL EVERY 6 HOURS
Status: DISCONTINUED | OUTPATIENT
Start: 2024-12-14 | End: 2024-12-16 | Stop reason: HOSPADM

## 2024-12-14 RX ORDER — SIMETHICONE 80 MG
80 TABLET,CHEWABLE ORAL 4 TIMES DAILY PRN
Status: DISCONTINUED | OUTPATIENT
Start: 2024-12-14 | End: 2024-12-16 | Stop reason: HOSPADM

## 2024-12-14 RX ORDER — BENZOCAINE/MENTHOL 6 MG-10 MG
1 LOZENGE MUCOUS MEMBRANE DAILY PRN
Status: DISCONTINUED | OUTPATIENT
Start: 2024-12-14 | End: 2024-12-16 | Stop reason: HOSPADM

## 2024-12-14 RX ORDER — CALCIUM CARBONATE 500 MG/1
1000 TABLET, CHEWABLE ORAL DAILY PRN
Status: DISCONTINUED | OUTPATIENT
Start: 2024-12-14 | End: 2024-12-16 | Stop reason: HOSPADM

## 2024-12-14 RX ORDER — ONDANSETRON 2 MG/ML
4 INJECTION INTRAMUSCULAR; INTRAVENOUS EVERY 8 HOURS PRN
Status: DISCONTINUED | OUTPATIENT
Start: 2024-12-14 | End: 2024-12-16 | Stop reason: HOSPADM

## 2024-12-14 RX ORDER — OXYTOCIN/RINGER'S LACTATE 30/500 ML
PLASTIC BAG, INJECTION (ML) INTRAVENOUS
Status: COMPLETED
Start: 2024-12-14 | End: 2024-12-14

## 2024-12-14 RX ADMIN — IBUPROFEN 600 MG: 600 TABLET ORAL at 10:49

## 2024-12-14 RX ADMIN — IBUPROFEN 600 MG: 600 TABLET ORAL at 04:23

## 2024-12-14 RX ADMIN — IBUPROFEN 600 MG: 600 TABLET ORAL at 18:15

## 2024-12-14 RX ADMIN — BENZOCAINE AND LEVOMENTHOL 1 APPLICATION: 200; 5 SPRAY TOPICAL at 04:23

## 2024-12-14 RX ADMIN — WITCH HAZEL 1 PAD: 500 SOLUTION RECTAL; TOPICAL at 04:23

## 2024-12-14 RX ADMIN — ACETAMINOPHEN 650 MG: 325 TABLET, FILM COATED ORAL at 18:14

## 2024-12-14 RX ADMIN — ROPIVACAINE HYDROCHLORIDE: 2 INJECTION, SOLUTION EPIDURAL; INFILTRATION at 02:49

## 2024-12-14 RX ADMIN — OXYTOCIN 250 MILLI-UNITS/MIN: 10 INJECTION INTRAVENOUS at 03:21

## 2024-12-14 RX ADMIN — ACETAMINOPHEN 650 MG: 325 TABLET, FILM COATED ORAL at 10:49

## 2024-12-14 RX ADMIN — HYDROCORTISONE 1 APPLICATION: 1 CREAM TOPICAL at 13:23

## 2024-12-14 RX ADMIN — Medication 250 MILLI-UNITS/MIN: at 03:21

## 2024-12-14 NOTE — OB LABOR/OXYTOCIN SAFETY PROGRESS
Labor Progress Note - aY Montez 29 y.o. female MRN: [id number]    Unit/Bed#: -01 Encounter: [id number]       Contraction Frequency (minutes): 2-3  Contraction Intensity: Mild/Moderate  Uterine Activity Characteristics: Irregular  Cervical Dilation: 4        Cervical Effacement: 80  Fetal Station: -2  Baseline Rate (FHR): 120 bpm  Fetal Heart Rate (FHT): 125 BPM  FHR Category: I               Vital Signs:   Vitals:    12/13/24 2310   BP: 107/62   Pulse: 80   Resp:    Temp:    SpO2:        Notes/comments:   FHT category 1.  He is valeria every 3 to 5 minutes on the monitor.  Sometimes not tracing contractions depending on position.  Continue expectant management.  Plan for next SVE in 2 hours.    Shelby Esqueda MD 12/13/2024 11:40 PM

## 2024-12-14 NOTE — L&D DELIVERY NOTE
Vaginal Delivery Summary - OB/GYN   Ya Montez 29 y.o. female MRN: [id number]  Unit/Bed#: -01 Encounter: [id number]      Pre-delivery Diagnosis:   1. Pregnancy at 36 weeks    2. PPROM  3. Elevated blood pressure without a diagnosis     Post-delivery Diagnosis: same, delivered    Procedure: Spontaneous Vaginal Delivery, repair of 1st degree perineal laceration    Attending: Dr. Rodriguez     Assistant(s): Barr    Anesthesia: Epidural    QBL: 57 mL    Complications: none apparent    Specimens:   1. Arterial and venous cord gases  2. Cord blood  3. Segment of umbilical cord  4. Placenta to storage     Findings:  1. Viable male on 2024 at 0320, with APGARS of 8 and 8 at 1 and 5 minutes respectively  2. Spontaneous delivery of intact placenta at 0325  3. 1 degree laceration repaired with 3-0 Vicryl rapide. Superficial bilateral labial lacerations that were hemostatic and did not require repair  4. Blood gases:   Arterial pH: 7.243   Arterial base excess: -3.5   Venous pH: 7.384   Venous base excess: -3.1    Disposition:  Patient tolerated the procedure well and was recovering in labor and delivery room       Brief history and labor course:  Ms. Ya Montez is a 29 y.o.  at 36w4d. She presented to labor and delivery for PPROM. Her pregnancy was uncomplicated. On exam in triage she was noted to be grossly ruptured and 0/50/-5. She was admitted for PPROM. She was induced with cytotec. She made change after one dose of cytotec and was contractions regularly on her own. She did not require pitocin. She received an epidural for analgesia.  She had a few elevated blood pressures in the mild range intrapartum.  None over 4 hours apart.  CBC and CMP were within normal limits and urine protein creatinine was 0.1.  She progressed to complete and started pushing.    Description of procedure:  After pushing for 12 minutes, at 0320 patient delivered a viable male , wt pending, apgars of 8 (1 min)  and 8 (5 min). The fetal vertex delivered spontaneously. There was no nuchal cord. The right anterior shoulder delivered atraumatically with maternal expulsive forces and the assistance of gentle downward traction. The left posterior shoulder delivered with maternal expulsive forces and the assistance of gentle upward traction. The remainder of the fetus delivered spontaneously.     Upon delivery, the infant was placed on the mothers abdomen and the cord was clamped and cut after delayed cord clamping. The infant was noted to cry spontaneously and was moving all extremities appropriately. There was no evidence for injury. Awaiting nurse resuscitators evaluated the . Arterial and venous cord blood gases and cord blood was collected for analysis. These were promptly sent to the lab. In the immediate post-partum, 30 units of IV pitocin was administered, and the uterus was noted to contract down well with massage and pitocin. The placenta delivered spontaneously at 0325 and was noted to have a centrally inserted 3 vessel cord.     The vagina, cervix, perineum, and rectum were inspected and there was noted to be a 1st degree perineal laceration and bilateral labial lacerations. The perineal laceration was repaired in the usual fashion with 3-0 Vicryl rapide.  The bilateral labial lacerations were superficial and hemostatic and did not require repair.    Fundal massage revealed minimal clots and good uterine tone.  The fundus was firm and at the level of the umbilicus.  At the conclusion of the procedure, all needle, sponge, and instrument counts were noted to be correct. Patient tolerated the procedure well and was allowed to recover in labor and delivery room with family and  before being transferred to the post-partum floor. Dr. Rodriguez was present and participated in all key portions of the case.      Shelby Esqueda MD  2024  3:43 AM

## 2024-12-14 NOTE — PLAN OF CARE
Problem: Knowledge Deficit  Goal: Verbalizes understanding of labor plan  Description: Assess patient/family/caregiver's baseline knowledge level and ability to understand information.  Provide education via patient/family/caregiver's preferred learning method at appropriate level of understanding.     1. Provide teaching at level of understanding.  2. Provide teaching via preferred learning method(s).  Outcome: Progressing     Problem: PAIN -   Goal: Displays adequate comfort level or baseline comfort level  Description: INTERVENTIONS:  - Perform pain scoring using age-appropriate tool with hands-on care as needed.  Notify physician/AP of high pain scores not responsive to comfort measures  - Administer analgesics based on type and severity of pain and evaluate response  - Sucrose analgesia per protocol for brief minor painful procedures  - Teach parents interventions for comforting infant  Outcome: Progressing     Problem: THERMOREGULATION - PEDIATRICS  Goal: Maintains normal body temperature  Description: Interventions:  - Monitor temperature (axillary for Newborns) as ordered  - Monitor for signs of hypothermia or hyperthermia  - Provide thermal support measures  - Wean to open crib when appropriate  Outcome: Progressing     Problem: INFECTION -   Goal: No evidence of infection  Description: INTERVENTIONS:  - Instruct family/visitors to use good hand hygiene technique  - Identify and instruct in appropriate isolation precautions for identified infection/condition  - Change incubator every 2 weeks or as needed.  - Monitor for symptoms of infection  - Monitor surgical sites and insertion sites for all indwelling lines, tubes, and drains for drainage, redness, or edema.  - Monitor endotracheal and nasal secretions for changes in amount and color  - Monitor culture and CBC results  - Administer antibiotics as ordered.  Monitor drug levels  Outcome: Progressing     Problem: RISK FOR INFECTION (RISK  FACTORS FOR MATERNAL CHORIOAMNIOITIS - )  Goal: No evidence of infection  Description: INTERVENTIONS:  - Instruct family/visitors to use good hand hygiene technique  - Monitor for symptoms of infection  - Monitor culture and CBC results  - Administer antibiotics as ordered.  Monitor drug levels  Outcome: Progressing     Problem: SAFETY -   Goal: Patient will remain free from falls  Description: INTERVENTIONS:  - Instruct family/caregiver on patient safety  - Keep incubator doors and portholes closed when unattended  - Keep radiant warmer side rails and crib rails up when unattended  - Based on caregiver fall risk screen, instruct family/caregiver to ask for assistance with transferring infant if caregiver noted to have fall risk factors  Outcome: Progressing     Problem: Knowledge Deficit  Goal: Patient/family/caregiver demonstrates understanding of disease process, treatment plan, medications, and discharge instructions  Description: Complete learning assessment and assess knowledge base.  Interventions:  - Provide teaching at level of understanding  - Provide teaching via preferred learning methods  Outcome: Progressing  Goal: Infant caregiver verbalizes understanding of benefits of skin-to-skin with healthy   Description: Prior to delivery, educate patient regarding skin-to-skin practice and its benefits  Initiate immediate and uninterrupted skin-to-skin contact after birth until breastfeeding is initiated or a minimum of one hour  Encourage continued skin-to-skin contact throughout the post partum stay    Outcome: Progressing  Goal: Infant caregiver verbalizes understanding of benefits and management of breastfeeding their healthy   Description: Help initiate breastfeeding within one hour of birth  Educate/assist with breastfeeding positioning and latch  Educate on safe positioning and to monitor their  for safety  Educate on how to maintain lactation even if they are   from their   Educate/initiate pumping for a mom with a baby in the NICU within 6 hours after birth  Give infants no food or drink other than breast milk unless medically indicated  Educate on feeding cues and encourage breastfeeding on demand    Outcome: Progressing  Goal: Infant caregiver verbalizes understanding of benefits to rooming-in with their healthy   Description: Promote rooming in 23 out of 24 hours per day  Educate on benefits to rooming-in  Provide  care in room with parents as long as infant and mother condition allow    Outcome: Progressing  Goal: Provide formula feeding instructions and preparation information to caregivers who do not wish to breastfeed their   Description: Provide one on one information on frequency, amount, and burping for formula feeding caregivers throughout their stay and at discharge.  Provide written information/video on formula preparation.    Outcome: Progressing  Goal: Infant caregiver verbalizes understanding of support and resources for follow up after discharge  Description: Provide individual discharge education on when to call the doctor.  Provide resources and contact information for post-discharge support.    Outcome: Progressing     Problem: DISCHARGE PLANNING  Goal: Discharge to home or other facility with appropriate resources  Description: INTERVENTIONS:  - Identify barriers to discharge w/patient and caregiver  - Arrange for needed discharge resources and transportation as appropriate  - Identify discharge learning needs (meds, wound care, etc.)  - Arrange for interpretive services to assist at discharge as needed  - Refer to Case Management Department for coordinating discharge planning if the patient needs post-hospital services based on physician/advanced practitioner order or complex needs related to functional status, cognitive ability, or social support system  Outcome: Progressing

## 2024-12-14 NOTE — DISCHARGE SUMMARY
Discharge Summary - OB/GYN  Ya Montez 29 y.o. female MRN: [id number]  Unit/Bed#: -01 Encounter: [id number]    Admission Date: 2024     Discharge Date: 24    Admitting Attending: Denisse Rodriguez MD    Delivering Attending:  Mulu Nolasco MD    Discharging Attending: Cirilo    Principal Diagnosis: Pregnancy at 36w5d    Secondary Diagnosis:   1. PPROM at 36w5d  2. Pre-eclampsia without severe features    Procedures: spontaneous vaginal delivery    Anesthesia: epidural    Hospital course: Ya Montez is a 29 y.o.  admitted at 36w4d for PPROM. She was induced with cytocec and progressed into her own labor without requiring pitocin for induction.    She delivered a viable male  on 2024 at 0320. Weight 5lbs 11.4oz via normal spontaneous vaginal delivery. She sustained a first degree laceration and bilateral labials during delivery which were adequately repaired. Apgars were 8 (1 min) and 8 (5 min).  was transferred to  nursery. Patient tolerated the procedure well.     Her post-delivery course was uncomplicated. Her postpartum pain was well controlled with oral analgesics.    On day of discharge, she was ambulating and able to reasonably perform all ADLs. She was voiding and had appropriate bowel function. Pain was well controlled. She was discharged home on postpartum day #2 without complications. Patient was instructed to follow up with her OB as an outpatient and was given appropriate warnings to call provider if she develops signs of infection or uncontrolled pain.    Complications: none apparent    Condition at discharge: good     Discharge instructions/Information to patient and family:   See after visit summary for information provided to patient and family.      Provisions for Follow-Up Care:  See after visit summary for information related to follow-up care and any pertinent home health orders.      Disposition: See After Visit Summary for  discharge disposition information.    Planned Readmission: No    Discharge medications and instructions:   Please see AVS for full list of medications upon discharge.

## 2024-12-14 NOTE — OB LABOR/OXYTOCIN SAFETY PROGRESS
Labor Progress Note - Ya Montez 29 y.o. female MRN: [id number]    Unit/Bed#: -01 Encounter: [id number]       Contraction Frequency (minutes): 2-3.5  Contraction Intensity: Strong  Uterine Activity Characteristics: Irritability  Cervical Dilation: 10  Dilation Complete Date: 12/14/24  Dilation Complete Time: 0302  Cervical Effacement: 100  Fetal Station: 2  Baseline Rate (FHR): 120 bpm  Fetal Heart Rate (FHT): 119 BPM  FHR Category: II               Vital Signs:   Vitals:    12/14/24 0254   BP: 123/78   Pulse: 86   Resp:    Temp:    SpO2:        Notes/comments:   FHT cat II with variable decelerations. Moderate variability. Plan to start pushing. Dr. Michael Esqueda MD 12/14/2024 3:04 AM

## 2024-12-14 NOTE — PLAN OF CARE
Problem: BIRTH - VAGINAL/ SECTION  Goal: Fetal and maternal status remain reassuring during the birth process  Description: INTERVENTIONS:  - Monitor vital signs  - Monitor fetal heart rate  - Monitor uterine activity  - Monitor labor progression (vaginal delivery)  - DVT prophylaxis  - Antibiotic prophylaxis  Outcome: Progressing  Goal: Emotionally satisfying birthing experience for mother/fetus  Description: Interventions:  - Assess, plan, implement and evaluate the nursing care given to the patient in labor  - Advocate the philosophy that each childbirth experience is a unique experience and support the family's chosen level of involvement and control during the labor process   - Actively participate in both the patient's and family's teaching of the birth process  - Consider cultural, Zoroastrian and age-specific factors and plan care for the patient in labor  Outcome: Progressing     Problem: Knowledge Deficit  Goal: Verbalizes understanding of labor plan  Description: Assess patient/family/caregiver's baseline knowledge level and ability to understand information.  Provide education via patient/family/caregiver's preferred learning method at appropriate level of understanding.     1. Provide teaching at level of understanding.  2. Provide teaching via preferred learning method(s).  Outcome: Progressing     Problem: Labor & Delivery  Goal: Manages discomfort  Description: Assess and monitor for signs and symptoms of discomfort.  Assess patient's pain level regularly and per hospital policy.  Administer medications as ordered. Support use of nonpharmacological methods to help control pain such as distraction, imagery, relaxation, and application of heat and cold.  Collaborate with interdisciplinary team and patient to determine appropriate pain management plan.    1. Include patient in decisions related to comfort.  2. Offer non-pharmacological pain management interventions.  3. Report ineffective pain  management to physician.  Outcome: Progressing  Goal: Patient vital signs are stable  Description: 1. Assess vital signs - vaginal delivery.  Outcome: Progressing     Problem: PAIN -   Goal: Displays adequate comfort level or baseline comfort level  Description: INTERVENTIONS:  - Perform pain scoring using age-appropriate tool with hands-on care as needed.  Notify physician/AP of high pain scores not responsive to comfort measures  - Administer analgesics based on type and severity of pain and evaluate response  - Sucrose analgesia per protocol for brief minor painful procedures  - Teach parents interventions for comforting infant  Outcome: Progressing     Problem: THERMOREGULATION - PEDIATRICS  Goal: Maintains normal body temperature  Description: Interventions:  - Monitor temperature (axillary for Newborns) as ordered  - Monitor for signs of hypothermia or hyperthermia  - Provide thermal support measures  - Wean to open crib when appropriate  Outcome: Progressing     Problem: INFECTION -   Goal: No evidence of infection  Description: INTERVENTIONS:  - Instruct family/visitors to use good hand hygiene technique  - Identify and instruct in appropriate isolation precautions for identified infection/condition  - Change incubator every 2 weeks or as needed.  - Monitor for symptoms of infection  - Monitor surgical sites and insertion sites for all indwelling lines, tubes, and drains for drainage, redness, or edema.  - Monitor endotracheal and nasal secretions for changes in amount and color  - Monitor culture and CBC results  - Administer antibiotics as ordered.  Monitor drug levels  Outcome: Progressing     Problem: RISK FOR INFECTION (RISK FACTORS FOR MATERNAL CHORIOAMNIOITIS - )  Goal: No evidence of infection  Description: INTERVENTIONS:  - Instruct family/visitors to use good hand hygiene technique  - Monitor for symptoms of infection  - Monitor culture and CBC results  - Administer antibiotics  as ordered.  Monitor drug levels  Outcome: Progressing     Problem: SAFETY -   Goal: Patient will remain free from falls  Description: INTERVENTIONS:  - Instruct family/caregiver on patient safety  - Keep incubator doors and portholes closed when unattended  - Keep radiant warmer side rails and crib rails up when unattended  - Based on caregiver fall risk screen, instruct family/caregiver to ask for assistance with transferring infant if caregiver noted to have fall risk factors  Outcome: Progressing     Problem: Knowledge Deficit  Goal: Patient/family/caregiver demonstrates understanding of disease process, treatment plan, medications, and discharge instructions  Description: Complete learning assessment and assess knowledge base.  Interventions:  - Provide teaching at level of understanding  - Provide teaching via preferred learning methods  Outcome: Progressing  Goal: Infant caregiver verbalizes understanding of benefits of skin-to-skin with healthy   Description: Prior to delivery, educate patient regarding skin-to-skin practice and its benefits  Initiate immediate and uninterrupted skin-to-skin contact after birth until breastfeeding is initiated or a minimum of one hour  Encourage continued skin-to-skin contact throughout the post partum stay    Outcome: Progressing  Goal: Infant caregiver verbalizes understanding of benefits and management of breastfeeding their healthy   Description: Help initiate breastfeeding within one hour of birth  Educate/assist with breastfeeding positioning and latch  Educate on safe positioning and to monitor their  for safety  Educate on how to maintain lactation even if they are  from their   Educate/initiate pumping for a mom with a baby in the NICU within 6 hours after birth  Give infants no food or drink other than breast milk unless medically indicated  Educate on feeding cues and encourage breastfeeding on demand    Outcome:  Progressing  Goal: Infant caregiver verbalizes understanding of benefits to rooming-in with their healthy   Description: Promote rooming in 23 out of 24 hours per day  Educate on benefits to rooming-in  Provide  care in room with parents as long as infant and mother condition allow    Outcome: Progressing  Goal: Provide formula feeding instructions and preparation information to caregivers who do not wish to breastfeed their   Description: Provide one on one information on frequency, amount, and burping for formula feeding caregivers throughout their stay and at discharge.  Provide written information/video on formula preparation.    Outcome: Progressing  Goal: Infant caregiver verbalizes understanding of support and resources for follow up after discharge  Description: Provide individual discharge education on when to call the doctor.  Provide resources and contact information for post-discharge support.    Outcome: Progressing     Problem: DISCHARGE PLANNING  Goal: Discharge to home or other facility with appropriate resources  Description: INTERVENTIONS:  - Identify barriers to discharge w/patient and caregiver  - Arrange for needed discharge resources and transportation as appropriate  - Identify discharge learning needs (meds, wound care, etc.)  - Arrange for interpretive services to assist at discharge as needed  - Refer to Case Management Department for coordinating discharge planning if the patient needs post-hospital services based on physician/advanced practitioner order or complex needs related to functional status, cognitive ability, or social support system  Outcome: Progressing

## 2024-12-14 NOTE — PLAN OF CARE
Problem: POSTPARTUM  Goal: Experiences normal postpartum course  Description: INTERVENTIONS:  - Monitor maternal vital signs  - Assess uterine involution and lochia  Outcome: Progressing  Goal: Appropriate maternal -  bonding  Description: INTERVENTIONS:  - Identify family support  - Assess for appropriate maternal/infant bonding   -Encourage maternal/infant bonding opportunities  - Referral to  or  as needed  Outcome: Progressing  Goal: Establishment of infant feeding pattern  Description: INTERVENTIONS:  - Assess breast/bottle feeding  - Refer to lactation as needed  Outcome: Progressing  Goal: Incision(s), wounds(s) or drain site(s) healing without S/S of infection  Description: INTERVENTIONS  - Assess and document dressing, incision, wound bed, drain sites and surrounding tissue  - Provide patient and family education  - Perform skin care/dressing changes shauna  Outcome: Progressing

## 2024-12-14 NOTE — ANESTHESIA PROCEDURE NOTES
Epidural Block    Start time: 12/13/2024 7:10 PM  Reason for block: procedure for pain and at surgeon's request  Staffing  Performed by: Chloe Samaniego CRNA  Authorized by: Aaron Cazares DO    Preanesthetic Checklist  Completed: patient identified, IV checked, risks and benefits discussed, surgical consent, monitors and equipment checked, pre-op evaluation and timeout performed  Epidural  Patient position: sitting  Prep: ChloraPrep  Sedation Level: no sedation  Patient monitoring: frequent blood pressure checks, continuous pulse oximetry and heart rate  Approach: midline  Location: lumbar, L2-3  Injection technique: EMILIE saline  Needle  Needle type: Tuohy   Needle gauge: 17 G  Needle insertion depth: 4 cm  Catheter type: multi-orifice  Catheter size: 19 G  Catheter at skin depth: 10 cm  Catheter securement method: stabilization device and clear occlusive dressing  Test dose: negative  Assessment  Sensory level: T10  Number of attempts: 1negative aspiration for CSF, negative aspiration for heme and no paresthesia on injection  patient tolerated the procedure well with no immediate complications  Additional Notes  EMILIE at 4, taped at 10cm

## 2024-12-14 NOTE — OB LABOR/OXYTOCIN SAFETY PROGRESS
Labor Progress Note - Ya Montez 29 y.o. female MRN: [id number]    Unit/Bed#: -01 Encounter: [id number]       Contraction Frequency (minutes): (P) 4.5-8  Contraction Intensity: Mild/Moderate  Uterine Activity Characteristics: (P) Irregular  Cervical Dilation: 9        Cervical Effacement: 90  Fetal Station: 0  Baseline Rate (FHR): (P) 120 bpm  Fetal Heart Rate (FHT): (P) 130 BPM  FHR Category: I               Vital Signs:   Vitals:    12/14/24 0140   BP: 116/78   Pulse:    Resp:    Temp:    SpO2:        Notes/comments:   Patient is feeling well.  She has some right-sided pain at times but currently does not have it.  SVE as above.  Baby palpated to be asynclitic and ROT. Dr. Rodriguez aware    Shelby Esqueda MD 12/14/2024 1:48 AM

## 2024-12-14 NOTE — OB LABOR/OXYTOCIN SAFETY PROGRESS
Labor Progress Note - Ya Montez 29 y.o. female MRN: [id number]    Unit/Bed#: -01 Encounter: [id number]       Contraction Frequency (minutes): 3.5-4  Contraction Intensity: Mild/Moderate  Uterine Activity Characteristics: Regular  Cervical Dilation: 4        Cervical Effacement: 80  Fetal Station: -2  Baseline Rate (FHR): 125 bpm  Fetal Heart Rate (FHT): 124 BPM  FHR Category: I               Vital Signs:   Vitals:    12/13/24 2109   BP: 118/78   Pulse: 76   Resp:    Temp:    SpO2:        Notes/comments:   Patient is feeling well status post epidural placement.  FHT is category 1.  She is valeria every 1 to 4 minutes.  Given that she may change, can hold off on Pitocin at this time. Discussed with Dr. Rodriguez.    Shelby Esqueda MD 12/13/2024 9:37 PM

## 2024-12-14 NOTE — ASSESSMENT & PLAN NOTE
CBC, CMP wnl, UPC 0.3  BP monitoring    Systolic (12hrs), Av , Min:101 , Max:121   Diastolic (12hrs), Av, Min:57, Max:88

## 2024-12-14 NOTE — PLAN OF CARE
Problem: BIRTH - VAGINAL/ SECTION  Goal: Fetal and maternal status remain reassuring during the birth process  Description: INTERVENTIONS:  - Monitor vital signs  - Monitor fetal heart rate  - Monitor uterine activity  - Monitor labor progression (vaginal delivery)  - DVT prophylaxis  - Antibiotic prophylaxis  2024 by Swathi Lantigua RN  Outcome: Completed  2024 by Swathi Lantigua RN  Outcome: Progressing  Goal: Emotionally satisfying birthing experience for mother/fetus  Description: Interventions:  - Assess, plan, implement and evaluate the nursing care given to the patient in labor  - Advocate the philosophy that each childbirth experience is a unique experience and support the family's chosen level of involvement and control during the labor process   - Actively participate in both the patient's and family's teaching of the birth process  - Consider cultural, Protestant and age-specific factors and plan care for the patient in labor  2024 by Swathi Lantigua RN  Outcome: Completed  2024 by Swathi Lantigua RN  Outcome: Progressing     Problem: Knowledge Deficit  Goal: Verbalizes understanding of labor plan  Description: Assess patient/family/caregiver's baseline knowledge level and ability to understand information.  Provide education via patient/family/caregiver's preferred learning method at appropriate level of understanding.     1. Provide teaching at level of understanding.  2. Provide teaching via preferred learning method(s).  2024 by Swathi Lantigua RN  Outcome: Completed  2024 by Swathi Lantigua RN  Outcome: Adequate for Discharge  2024 by Swathi Lantigua RN  Outcome: Progressing     Problem: Labor & Delivery  Goal: Manages discomfort  Description: Assess and monitor for signs and symptoms of discomfort.  Assess patient's pain level regularly and per hospital policy.  Administer medications as ordered. Support use  of nonpharmacological methods to help control pain such as distraction, imagery, relaxation, and application of heat and cold.  Collaborate with interdisciplinary team and patient to determine appropriate pain management plan.    1. Include patient in decisions related to comfort.  2. Offer non-pharmacological pain management interventions.  3. Report ineffective pain management to physician.  2024 by Swathi Lantigua RN  Outcome: Completed  2024 by Swathi Lantigua RN  Outcome: Progressing  Goal: Patient vital signs are stable  Description: 1. Assess vital signs - vaginal delivery.  2024 by Swathi Lantigua RN  Outcome: Completed  2024 by Swathi Lantigua RN  Outcome: Progressing     Problem: PAIN -   Goal: Displays adequate comfort level or baseline comfort level  Description: INTERVENTIONS:  - Perform pain scoring using age-appropriate tool with hands-on care as needed.  Notify physician/AP of high pain scores not responsive to comfort measures  - Administer analgesics based on type and severity of pain and evaluate response  - Sucrose analgesia per protocol for brief minor painful procedures  - Teach parents interventions for comforting infant  2024 by Swathi Lantigua RN  Outcome: Completed  2024 by Swathi Lantgiua RN  Outcome: Adequate for Discharge  2024 by Swathi Lantigua RN  Outcome: Progressing     Problem: THERMOREGULATION - PEDIATRICS  Goal: Maintains normal body temperature  Description: Interventions:  - Monitor temperature (axillary for Newborns) as ordered  - Monitor for signs of hypothermia or hyperthermia  - Provide thermal support measures  - Wean to open crib when appropriate  2024 by Swathi Lantigua RN  Outcome: Completed  2024 by Swathi Lantigua RN  Outcome: Adequate for Discharge  2024 by Swathi Lantigua RN  Outcome: Progressing     Problem: INFECTION -   Goal: No  evidence of infection  Description: INTERVENTIONS:  - Instruct family/visitors to use good hand hygiene technique  - Identify and instruct in appropriate isolation precautions for identified infection/condition  - Change incubator every 2 weeks or as needed.  - Monitor for symptoms of infection  - Monitor surgical sites and insertion sites for all indwelling lines, tubes, and drains for drainage, redness, or edema.  - Monitor endotracheal and nasal secretions for changes in amount and color  - Monitor culture and CBC results  - Administer antibiotics as ordered.  Monitor drug levels  2024 by Swathi Lantigua RN  Outcome: Completed  2024 by Swathi Lantigua RN  Outcome: Adequate for Discharge  2024 by Swathi Lantigua RN  Outcome: Progressing     Problem: RISK FOR INFECTION (RISK FACTORS FOR MATERNAL CHORIOAMNIOITIS - )  Goal: No evidence of infection  Description: INTERVENTIONS:  - Instruct family/visitors to use good hand hygiene technique  - Monitor for symptoms of infection  - Monitor culture and CBC results  - Administer antibiotics as ordered.  Monitor drug levels  2024 by Swathi Lantigua RN  Outcome: Completed  2024 by Swathi Lantigua RN  Outcome: Adequate for Discharge  2024 by Swathi Lantigua RN  Outcome: Progressing     Problem: SAFETY -   Goal: Patient will remain free from falls  Description: INTERVENTIONS:  - Instruct family/caregiver on patient safety  - Keep incubator doors and portholes closed when unattended  - Keep radiant warmer side rails and crib rails up when unattended  - Based on caregiver fall risk screen, instruct family/caregiver to ask for assistance with transferring infant if caregiver noted to have fall risk factors  2024 by Swathi Lantigua RN  Outcome: Completed  2024 by Swathi Lantigua RN  Outcome: Adequate for Discharge  2024 by Swathi Lantigua RN  Outcome:  Progressing     Problem: Knowledge Deficit  Goal: Patient/family/caregiver demonstrates understanding of disease process, treatment plan, medications, and discharge instructions  Description: Complete learning assessment and assess knowledge base.  Interventions:  - Provide teaching at level of understanding  - Provide teaching via preferred learning methods  2024 by Swathi Lantigua RN  Outcome: Completed  2024 by Swathi Lantigua RN  Outcome: Adequate for Discharge  2024 by Swathi Lantigua RN  Outcome: Progressing  Goal: Infant caregiver verbalizes understanding of benefits of skin-to-skin with healthy   Description: Prior to delivery, educate patient regarding skin-to-skin practice and its benefits  Initiate immediate and uninterrupted skin-to-skin contact after birth until breastfeeding is initiated or a minimum of one hour  Encourage continued skin-to-skin contact throughout the post partum stay    2024 by Swathi Lantigua RN  Outcome: Completed  2024 by Swathi Lantigua RN  Outcome: Adequate for Discharge  2024 by Swathi Lantigua RN  Outcome: Progressing  Goal: Infant caregiver verbalizes understanding of benefits and management of breastfeeding their healthy   Description: Help initiate breastfeeding within one hour of birth  Educate/assist with breastfeeding positioning and latch  Educate on safe positioning and to monitor their  for safety  Educate on how to maintain lactation even if they are  from their   Educate/initiate pumping for a mom with a baby in the NICU within 6 hours after birth  Give infants no food or drink other than breast milk unless medically indicated  Educate on feeding cues and encourage breastfeeding on demand    2024 by Swathi Lantigua RN  Outcome: Completed  2024 by Swathi Lantigua RN  Outcome: Adequate for Discharge  2024 by Swathi Lantigua  RN  Outcome: Progressing  Goal: Infant caregiver verbalizes understanding of benefits to rooming-in with their healthy   Description: Promote rooming in 23 out of 24 hours per day  Educate on benefits to rooming-in  Provide  care in room with parents as long as infant and mother condition allow    2024 by Swathi Lantigua RN  Outcome: Completed  2024 by Swathi Lantigua RN  Outcome: Adequate for Discharge  2024 by Swathi Lantigua RN  Outcome: Progressing  Goal: Provide formula feeding instructions and preparation information to caregivers who do not wish to breastfeed their   Description: Provide one on one information on frequency, amount, and burping for formula feeding caregivers throughout their stay and at discharge.  Provide written information/video on formula preparation.    2024 by Swathi Lantigua RN  Outcome: Completed  2024 by Swathi Lantigua RN  Outcome: Adequate for Discharge  2024 by Swathi Lantigua RN  Outcome: Progressing  Goal: Infant caregiver verbalizes understanding of support and resources for follow up after discharge  Description: Provide individual discharge education on when to call the doctor.  Provide resources and contact information for post-discharge support.    2024 by Swathi Lantigua RN  Outcome: Completed  2024 by Swathi Lantigua RN  Outcome: Adequate for Discharge  2024 by Swathi Lantigua RN  Outcome: Progressing     Problem: DISCHARGE PLANNING  Goal: Discharge to home or other facility with appropriate resources  Description: INTERVENTIONS:  - Identify barriers to discharge w/patient and caregiver  - Arrange for needed discharge resources and transportation as appropriate  - Identify discharge learning needs (meds, wound care, etc.)  - Arrange for interpretive services to assist at discharge as needed  - Refer to Case Management Department for coordinating  discharge planning if the patient needs post-hospital services based on physician/advanced practitioner order or complex needs related to functional status, cognitive ability, or social support system  12/14/2024 0353 by Swathi Lantigua RN  Outcome: Completed  12/14/2024 0353 by Swathi Lantigua RN  Outcome: Adequate for Discharge  12/13/2024 2009 by Swathi Lantigua RN  Outcome: Progressing

## 2024-12-15 PROBLEM — O14.90 PREECLAMPSIA: Status: ACTIVE | Noted: 2024-12-13

## 2024-12-15 PROCEDURE — NC001 PR NO CHARGE: Performed by: STUDENT IN AN ORGANIZED HEALTH CARE EDUCATION/TRAINING PROGRAM

## 2024-12-15 RX ADMIN — ACETAMINOPHEN 650 MG: 325 TABLET, FILM COATED ORAL at 18:13

## 2024-12-15 RX ADMIN — IBUPROFEN 600 MG: 600 TABLET ORAL at 18:13

## 2024-12-15 RX ADMIN — ACETAMINOPHEN 650 MG: 325 TABLET, FILM COATED ORAL at 00:14

## 2024-12-15 RX ADMIN — ACETAMINOPHEN 650 MG: 325 TABLET, FILM COATED ORAL at 23:29

## 2024-12-15 RX ADMIN — IBUPROFEN 600 MG: 600 TABLET ORAL at 23:29

## 2024-12-15 RX ADMIN — IBUPROFEN 600 MG: 600 TABLET ORAL at 06:39

## 2024-12-15 RX ADMIN — IBUPROFEN 600 MG: 600 TABLET ORAL at 00:14

## 2024-12-15 RX ADMIN — IBUPROFEN 600 MG: 600 TABLET ORAL at 12:13

## 2024-12-15 RX ADMIN — ACETAMINOPHEN 650 MG: 325 TABLET, FILM COATED ORAL at 12:13

## 2024-12-15 RX ADMIN — ACETAMINOPHEN 650 MG: 325 TABLET, FILM COATED ORAL at 06:39

## 2024-12-15 NOTE — ASSESSMENT & PLAN NOTE
Continue routine post partum care  Encourage ambulation  Encourage breastfeeding  Contraception: Undecided  Anticipate discharge PPD 2   Symptoms of Retinal tear and detachment reviewed. Patient understands to call immediately with any such symptoms.

## 2024-12-15 NOTE — PLAN OF CARE

## 2024-12-15 NOTE — PROGRESS NOTES
"Progress Note - OB/GYN   Name: Ya Torres y.o. female I MRN: [id number]  Unit/Bed#: -01 I Date of Admission: 2024   Date of Service: 12/15/2024 I Hospital Day: 2     Assessment & Plan   (spontaneous vaginal delivery)  Continue routine post partum care  Encourage ambulation  Encourage breastfeeding  Contraception: Undecided  Anticipate discharge PPD 1 vs 2   Elevated blood pressure reading in office without diagnosis of hypertension  CBC, CMP wnl, UPC 0.3  BP monitoring    Systolic (12hrs), Av , Min:101 , Max:121   Diastolic (12hrs), Av, Min:57, Max:88      Progress Note - OB/GYN   Ya Montez 29 y.o. female MRN: [id number]  Unit/Bed#: -01 Encounter: [id number]    Assessment:  Post partum Day #1 s/p , stable, baby in the NICU    Subjective/Objective   Chief Complaint:     Post delivery. Patient is doing well. Lochia WNL. Pain well controlled.     Subjective:     Pain: yes, cramping, improved with meds  Tolerating PO: yes  Voiding: yes  Flatus: yes  Ambulating: yes  Chest pain: no  Shortness of breath: no  Leg pain: no  Lochia: minimal    Objective:     Vitals: /57   Pulse 56   Temp 97.6 °F (36.4 °C) (Oral)   Resp 18   Ht 5' 4\" (1.626 m)   Wt 74.4 kg (164 lb)   LMP 2024 (Exact Date)   SpO2 98%   Breastfeeding Yes   BMI 28.15 kg/m²     I/O          0701   0700  0701  12/15 07    I.V. (mL/kg) 103.6 (1.4)     Total Intake(mL/kg) 103.6 (1.4)     Urine (mL/kg/hr) 1250 600 (0.3)    Blood 57     Total Output 1307 600    Net -1203.5 -600                  Lab Results   Component Value Date    WBC 11.12 (H) 2024    HGB 14.0 2024    HCT 40.9 2024    MCV 95 2024     2024       Physical Exam:     Gen: AAOx3, NAD  CV: no acute distress  Lungs: no acute distress  Abd: Soft, non-tender, non-distended, no rebound or guarding  Uterine fundus firm and non-tender, 2 cm below the umbilicus.   Ext: Non " kimmie Alfred MD  OB GYN PGY1  12/15/24  1:50 AM

## 2024-12-15 NOTE — ASSESSMENT & PLAN NOTE
Met criteria in postpartum period  CBC/CMP wnl, UPC 0.3  BP monitoring  Monitor for preeclampsia symptoms  Consider oral anti-hypertensives for persistently elevated BP  Consider IV anti-hypertensive's for sustained severe range BP's > 160/110    Systolic (12hrs), Av , Min:114 , Max:120   Diastolic (12hrs), Av, Min:71, Max:82

## 2024-12-15 NOTE — PLAN OF CARE

## 2024-12-16 VITALS
HEIGHT: 64 IN | TEMPERATURE: 97.8 F | OXYGEN SATURATION: 97 % | HEART RATE: 73 BPM | RESPIRATION RATE: 18 BRPM | DIASTOLIC BLOOD PRESSURE: 88 MMHG | SYSTOLIC BLOOD PRESSURE: 123 MMHG | WEIGHT: 164 LBS | BODY MASS INDEX: 28 KG/M2

## 2024-12-16 PROCEDURE — 99024 POSTOP FOLLOW-UP VISIT: CPT | Performed by: STUDENT IN AN ORGANIZED HEALTH CARE EDUCATION/TRAINING PROGRAM

## 2024-12-16 PROCEDURE — 90707 MMR VACCINE SC: CPT

## 2024-12-16 PROCEDURE — NC001 PR NO CHARGE: Performed by: STUDENT IN AN ORGANIZED HEALTH CARE EDUCATION/TRAINING PROGRAM

## 2024-12-16 RX ORDER — BENZOCAINE/MENTHOL 6 MG-10 MG
1 LOZENGE MUCOUS MEMBRANE DAILY PRN
Start: 2024-12-16

## 2024-12-16 RX ADMIN — ACETAMINOPHEN 650 MG: 325 TABLET, FILM COATED ORAL at 12:52

## 2024-12-16 RX ADMIN — MEASLES, MUMPS, AND RUBELLA VIRUS VACCINE LIVE 0.5 ML: 1000; 12500; 1000 INJECTION, POWDER, LYOPHILIZED, FOR SUSPENSION SUBCUTANEOUS at 20:03

## 2024-12-16 RX ADMIN — IBUPROFEN 600 MG: 600 TABLET ORAL at 06:17

## 2024-12-16 RX ADMIN — ACETAMINOPHEN 650 MG: 325 TABLET, FILM COATED ORAL at 06:17

## 2024-12-16 NOTE — CASE MANAGEMENT
Case Management Progress Note    Patient name Ya Montez  Location /-01 MRN [id number]  : 1995 Date 2024       LOS (days): 3  Geometric Mean LOS (GMLOS) (days):   Days to GMLOS:        OBJECTIVE:        Current admission status: Inpatient  Preferred Pharmacy:   [facility]/pharmacy #[id number] - [place] - [address]  [address] [address]  Phone: [phone] Fax: [phone]    Primary Care Provider: Nathan Barakat MD    Primary Insurance: BLUE CROSS  Secondary Insurance:     PROGRESS NOTE:      CM met with MOB to introduce CM services, complete assessment, and provide CM contact info.    MOB had Significant Other present and verbalized agreement with personal interview with them present.    MOB reported the following:    Assessment:  Consult reason: NICU Admission  Gestational Age at Birth: 36 Weeks + 5 Days  MOB Name (& age if teen):   Ya Montez   FOB Name (& age if teen MOB):Mike Cody      Other Legal Guardian(s) for Baby: n/a    Other Children:   First Baby  Housing Plan/Lives with: MOB FOB and FOB parents    Insurance Coverage/Plan for Baby: MOB verbalizes that they will contact their insurance to add baby ASAP.   Support System: Family, Friends, and Spouse/Significant Other  Care Items: Car Seat, Crib/Bassinet (Safe Sleep Space), Diapers/Wipes, and Clothing  Method of Feeding: Breast Feeding  Breast Pump: Breast pump obtained prior to admission  Government Assistance Programs: None   Arrangements: MOB  Current Employment/Schooling: MOB employed Full Time  Mental Health History and/or Treatment:   None reported   Substance Use History and/or Treatment: None reported     Urine Drug Screen Results: Not Applicable  Children & Youth History: None  Current Legal Issues: N/A  Domestic/Intimate Partner Violence History: Denies.  NICU Resources: N/A    Discharge Plan:  Pediatrician:   [facility]   Prenatal/ Care: [facility]     Follow-Up Appointments Needed/Scheduled: TBD  Medications/DME/Other Referrals: TBD  Transportation Plan: MOB has a vehicle and FOB has a vehicle    Follow-Up Needed from Care Management:        No current needs, CM will continue to follow.

## 2024-12-16 NOTE — PLAN OF CARE

## 2024-12-16 NOTE — PROGRESS NOTES
"Progress Note - OB/GYN  Ya Montez 29 y.o. female MRN: [id number]  Unit/Bed#: -01 Encounter: [id number]    Assessment and Plan:   Ya Montez is a patient of: [facility] . She is PPD# 2 s/p  spontaneous vaginal delivery  Recovering well and is stable.     Preeclampsia  Assessment & Plan  Met criteria in postpartum period  CBC/CMP wnl, UPC 0.3  BP monitoring  Monitor for preeclampsia symptoms  Consider oral anti-hypertensives for persistently elevated BP  Consider IV anti-hypertensive's for sustained severe range BP's > 160/110    Systolic (12hrs), Av , Min:114 , Max:120   Diastolic (12hrs), Av, Min:71, Max:82        *  (spontaneous vaginal delivery)  Assessment & Plan  Continue routine post partum care  Encourage ambulation  Encourage breastfeeding  Contraception: Undecided  Anticipate discharge PPD 2         Disposition    - Anticipate discharge home on PPD# 2  Barriers to discharge: None     Mulu Nolasco MD  OBGYN PGY-1  12/15/2024 9:40 PM    Subjective/Objective     Chief Complaint: Postpartum State     Subjective:    Ya Montez is PPD#2 s/p  spontaneous vaginal delivery. She has no current complaints and had no acute events overnight.  Pain is well controlled.  Patient is currently voiding.  She is ambulating.  Patient is currently passing flatus and has had bowel movement. She is tolerating PO, and denies nausea or vomitting. Patient denies fever, chills, chest pain, shortness of breath, or calf tenderness. Lochia is normal. She is  Breastfeeding. She is recovering well and is stable.       Vitals:   /71 (BP Location: Right arm)   Pulse 63   Temp 97.9 °F (36.6 °C) (Oral)   Resp 16   Ht 5' 4\" (1.626 m)   Wt 74.4 kg (164 lb)   LMP 2024 (Exact Date)   SpO2 97%   Breastfeeding Yes   BMI 28.15 kg/m²     No intake or output data in the 24 hours ending 12/15/24 2140    Physical Exam:   GEN: Ya Montez appears well, alert and oriented x 3, pleasant " and cooperative   CARDIO: RRR, intact distals pulses  RESP:  non-labored breathing  ABDOMEN: soft, no tenderness, fundus below umbilicus  EXTREMITIES: Non tender, no erythema    Labs:   Recent Results (from the past 72 hours)   CBC    Collection Time: 12/13/24  2:10 PM   Result Value Ref Range    WBC 11.12 (H) 4.31 - 10.16 Thousand/uL    RBC 4.30 3.81 - 5.12 Million/uL    Hemoglobin 14.0 11.5 - 15.4 g/dL    Hematocrit 40.9 34.8 - 46.1 %    MCV 95 82 - 98 fL    MCH 32.6 26.8 - 34.3 pg    MCHC 34.2 31.4 - 37.4 g/dL    RDW 13.1 11.6 - 15.1 %    Platelets 209 149 - 390 Thousands/uL    MPV 11.0 8.9 - 12.7 fL   L&D GREEN / YELLOW ON HOLD    Collection Time: 12/13/24  2:10 PM   Result Value Ref Range    Extra Tube Hold for add-ons.    RPR-Syphilis Screening (Total Syphilis IGG/IGM)    Collection Time: 12/13/24  2:10 PM   Result Value Ref Range    Syphilis Total Antibody Non-reactive Non-Reactive   Comprehensive metabolic panel    Collection Time: 12/13/24  2:10 PM   Result Value Ref Range    Sodium 137 135 - 147 mmol/L    Potassium 3.7 3.5 - 5.3 mmol/L    Chloride 104 96 - 108 mmol/L    CO2 24 21 - 32 mmol/L    ANION GAP 9 4 - 13 mmol/L    BUN 8 5 - 25 mg/dL    Creatinine 0.58 (L) 0.60 - 1.30 mg/dL    Glucose 75 65 - 140 mg/dL    Calcium 9.5 8.4 - 10.2 mg/dL    AST 17 13 - 39 U/L    ALT 11 7 - 52 U/L    Alkaline Phosphatase 171 (H) 34 - 104 U/L    Total Protein 7.0 6.4 - 8.4 g/dL    Albumin 3.9 3.5 - 5.0 g/dL    Total Bilirubin 0.33 0.20 - 1.00 mg/dL    eGFR 126 ml/min/1.73sq m   Type and screen    Collection Time: 12/13/24  4:26 PM   Result Value Ref Range    ABO Grouping A     Rh Factor Positive     Antibody Screen Negative     Specimen Expiration Date 20241216    Lavender Top on hold    Collection Time: 12/13/24  4:30 PM   Result Value Ref Range    Extra Tube Hold for add-ons.    Protein / creatinine ratio, urine    Collection Time: 12/13/24  9:43 PM   Result Value Ref Range    Creatinine, Ur 70.4 Reference range not  established. mg/dL    Protein Urine Random 19.5 Reference range not established. mg/dL    Prot/Creat Ratio, Ur 0.3 (H) 0.0 - 0.1   CORD, Blood gas, arterial    Collection Time: 12/14/24  3:25 AM   Result Value Ref Range    pH, Cord Art 7.243 7.230 - 7.430    pCO2, Cord Art 60.3 (H) 30.0 - 60.0    pO2, Cord Art 14.4 5.0 - 25.0 mm HG    HCO3, Cord Art 25.4 17.3 - 27.3 mmol/L    Base Exc, Cord Art -3.5 (L) 3.0 - 11.0 mmol/L    O2 Content, Cord Art 6.8 ml/dl    O2 Hgb, Arterial Cord 26.0 %   CORD, Blood gas, venous    Collection Time: 12/14/24  3:25 AM   Result Value Ref Range    pH, Cord Satya 7.384 7.190 - 7.490    pCO2, Cord Satya 36.3 27.0 - 43.0 mm HG    pO2, Cord Satya 31.0 15.0 - 45.0 mm HG    HCO3, Cord Satya 21.2 12.2 - 28.6 mmol/L    Base Exc, Cord Satya -3.1 (L) 1.0 - 9.0 mmol/L    O2 Cont, Cord Satya 18.6 mL/dL    O2 HGB,VENOUS CORD 73.1 %       Meds:      Current Facility-Administered Medications:     acetaminophen (TYLENOL) tablet 650 mg, 650 mg, Oral, Q6H, Shelby Esqueda MD, 650 mg at 12/15/24 1813    benzocaine-menthol-lanolin-aloe (DERMOPLAST) 20-0.5 % topical spray 1 Application, 1 Application, Topical, Q6H PRN, Shelby Esqueda MD, 1 Application at 12/14/24 0423    calcium carbonate (TUMS) chewable tablet 1,000 mg, 1,000 mg, Oral, Daily PRN, Shelby Esqueda MD    hydrocortisone 1 % cream 1 Application, 1 Application, Topical, Daily PRN, Shelby Esqueda MD, 1 Application at 12/14/24 1323    ibuprofen (MOTRIN) tablet 600 mg, 600 mg, Oral, Q6H, Shelby Esqueda MD, 600 mg at 12/15/24 1813    measles-mumps-rubella (M-M-R II) vaccine 0.5 mL, 0.5 mL, Intramuscular, Once, Shelby Esqueda MD    ondansetron (ZOFRAN) injection 4 mg, 4 mg, Intravenous, Q8H PRN, Shelby Esqueda MD    simethicone (MYLICON) chewable tablet 80 mg, 80 mg, Oral, 4x Daily PRN, Shelby Esqueda MD    witch hazel-glycerin (TUCKS) topical pad 1 Pad, 1 Pad, Topical, Q4H PRN, Shelby Esqueda MD, 1 Pad at 12/14/24  0423    Facility-Administered Medications Ordered in Other Encounters:     lidocaine (PF) (XYLOCAINE-MPF) 1 % injection, , Other, PRN, Aaron Cazares DO, 3 mL at 12/13/24 1904    Lidocaine-EPINEPHrine (PF) (XYLOCAINE-MPF/EPINEPHRINE) 1.5 %-1:200,000 injection, , Epidural, PRN, Aaron Cazares DO, 2 mL at 12/13/24 1915    ropivacaine (NAROPIN) injection, , Epidural, PRN, Aaron Cazares DO, 5 mL at 12/13/24 1916

## 2024-12-16 NOTE — LACTATION NOTE
Pump Follow Up    Met with Ya who is pumping for her baby boy in NICU. Ya reports she is pumping and expressing milk with the manual pump. She states she get no milk with the double electric pump. Encouraged double pumping with the multi user pump for stimulation. Discussed that it is typical to see no visible milk and that she should follow electric pumping with her hand pump or hand expression to preference.     Discussed latching in NICU. Ya reports that she attempted a latch in NICU but was unsuccessful. She reports baby has been taking a bottle in NICU. Encouraged to call for lactation support in NICU for latch assistance.

## 2024-12-16 NOTE — UTILIZATION REVIEW
"NOTIFICATION OF INPATIENT ADMISSION   MATERNITY/DELIVERY AUTHORIZATION REQUEST   SERVICING FACILITY:   [facility]  Parent Child Health - L&D, [place], NICU  [address] [place] [address]  Tax ID: [id number]  NPI: [id number]   ATTENDING PROVIDER:  Attending Name and NPI#: Denisse Rodriguez Md [[id number]]  Address: [address] [place] [address]  Phone: [phone]   ADMISSION INFORMATION:  Place of Service: Inpatient [facility]  Place of Service Code: 21  Inpatient Admission Date/Time: 24 12:03 PM  Discharge Date/Time: No discharge date for patient encounter.  Admitting Diagnosis Code/Description:  36 weeks gestation of pregnancy [Z3A.36]  Amniotic fluid leaking [O42.90]  Cramping affecting pregnancy, antepartum [O26.899, R10.9]  Encounter for full-term uncomplicated delivery [O80]     Mother: Ya Montez 1995 Estimated Date of Delivery: 25  Delivering clinician: Denisse Rodriguez   OB History          1    Para   1    Term           1    AB   0    Living   1         SAB   0    IAB   0    Ectopic   0    Multiple   0    Live Births   1                Name & MRN:   Information for the patient's :  Tc Baby Boy (Ya) [[id number]]    Delivery Information:  Sex: male  Delivered 2024 3:20 AM by Vaginal, Spontaneous; Gestational Age: 36w5d     Measurements:  Weight: 5 lb 11.4 oz (2590 g);  Height: 18\"    APGAR 1 minute 5 minutes 10 minutes   Totals: 8 8       UTILIZATION REVIEW CONTACT:  Mulu Post, Utilization   Network Utilization Review Department  Phone: [phone]  Fax [phone]  Email: [email or web address]  Contact for approvals/pending authorizations, clinical reviews, and discharge.     PHYSICIAN ADVISORY SERVICES:  Medical Necessity Denial & Fcyd-sj-Zqvp Review  Phone: [phone]  Fax: [phone]  Email: [email or web address]   "   DISCHARGE SUPPORT TEAM:  For Patients Discharge Needs & Updates  Phone: [phone] opt. 2 Fax: [phone]  Email: [email or web address]

## 2024-12-16 NOTE — PLAN OF CARE

## 2024-12-16 NOTE — LACTATION NOTE
CONSULT - LACTATION  Ya Montez 29 y.o. female MRN: [id number]    [facility] AN L&D Room / Bed:  313/ 313-01 Encounter: [id number]    Maternal Information     MOTHER:  N/A  Maternal Age: This patient's mother is not on file.  OB History: This patient's mother is not on file.  Previouse breast reduction surgery? No    Lactation history:   Has patient previously breast fed: No   How long had patient previously breast fed:     Previous breast feeding complications:     This patient's mother is not on file.    Birth information:  YOB: 1995   Time of birth:     Sex: female   Delivery type:     Birth Weight: No birth weight on file.   Percent of Weight Change: Birth weight not on file     Gestational Age: <None>   [unfilled]    Assessment     Breast and nipple assessment:  large breasts; mom reports breasts are feeling heavier       12/16/24 1047   Lactation Consultation   Reason for Consult 5 min;10 minute   Lactation Consultant Total Time 15   Risk Factors NICU infant   Maternal Information   Has mother  before? No   Exclusive Pump and Bottle Feed No   Breasts/Nipples   Left Breast Filling;Soft   Right Breast Filling;Soft   Left Nipple Everted   Right Nipple Everted   Intervention Breast pump;Hand expression   Breastfeeding Status Yes   Breastfeeding Progress Not yet established   Reasons for not Breastfeeding Infant medical condition   Other OB Lactation Tools   Feeding Devices Bottle;Pump   Breast Pump   Pump 3;2;1  (has pump through insurance)   Pump Review/Education Setup, frequency, and cleaning;Milk storage   Initiated by nursing staff   Date Initiated 12/14/24   Patient Follow-Up   Lactation Consult Status 2   Follow-Up Type Inpatient;Call as needed   Other OB Lactation Documentation    Additional Problem Noted F/up: Mom is pumping every 2-3 hours for baby in NICU. Mom collecting 11-15 mLs. Mom using electric pump for about 10 minutes and hand  pump for 10 minutes. Enc to call when in NICU for help in latching baby.       Feeding recommendations:  pump every 2-3 hours    Set alarms to pump every 2 hrs during the day and every 3 hrs at night  Use massage, warmth, & hand expression to stimulate glandular tissue prior to pumping.  May use nipple cream/butter/oil where tunnel and funnel meet on flange to assist movement of breast tissue inside the flange  Use breast compressions, hands on pumping techniques to assist in expressing milk    Cycle pumping - Begin the pump in stimulation mode. Once milk is visible in the tunnel of the flange, change pump setting to expression mode. Once milk is NOT seen in the tunnel, cycle back to stimulation mode.Continue cycle pumping until end of feeding.    Pump both breasts simultaneously  Use all 5 senses when pumping to increase milk transfer.  Store expressed milk or feed expressed milk via syringe, NG tube or paced bottle feeding method.   Continue to hand express between feeds to stimulate the breasts frequently    Review Milkmob on youtube or scan QR code for MilkMob video  When with baby, place baby skin to skin. Attempt to latch when medically cleared.         Milk Mob       Dodie Manzanares MA 12/16/2024 10:49 AM

## 2024-12-17 ENCOUNTER — TELEPHONE (OUTPATIENT)
Dept: OBGYN CLINIC | Facility: CLINIC | Age: 29
End: 2024-12-17

## 2024-12-17 NOTE — TELEPHONE ENCOUNTER
Pt scheduled for 12/24      Tiesha Kerr MD  [facility]  Please schedule patient for 1 week BP check. Thanks.

## 2024-12-17 NOTE — ANESTHESIA POSTPROCEDURE EVALUATION
Post-Op Assessment Note    CV Status:  Stable    Pain management: adequate       Mental Status:  Alert and awake   Hydration Status:  Euvolemic   PONV Controlled:  Controlled   Airway Patency:  Patent     Post Op Vitals Reviewed: Yes    No anethesia notable event occurred.    Staff: Anesthesiologist           Last Filed PACU Vitals:  Vitals Value Taken Time   Temp     Pulse     BP     Resp     SpO2         Modified Hemant:  No data recorded

## 2024-12-17 NOTE — PLAN OF CARE
Problem: POSTPARTUM  Goal: Experiences normal postpartum course  Description: INTERVENTIONS:  - Monitor maternal vital signs  - Assess uterine involution and lochia  2024 by Sandra Taylor RN  Outcome: Adequate for Discharge  2024 by Sandra Taylor RN  Outcome: Progressing  Goal: Appropriate maternal -  bonding  Description: INTERVENTIONS:  - Identify family support  - Assess for appropriate maternal/infant bonding   -Encourage maternal/infant bonding opportunities  - Referral to  or  as needed  2024 by Sandra Taylor RN  Outcome: Adequate for Discharge  2024 by Sandra Taylor RN  Outcome: Progressing  Goal: Establishment of infant feeding pattern  Description: INTERVENTIONS:  - Assess breast/bottle feeding  - Refer to lactation as needed  2024 by Sandra Taylor RN  Outcome: Adequate for Discharge  2024 by Sandra Taylor RN  Outcome: Progressing  Goal: Incision(s), wounds(s) or drain site(s) healing without S/S of infection  Description: INTERVENTIONS  - Assess and document dressing, incision, wound bed, drain sites and surrounding tissue  - Provide patient and family education  - Perform skin care/dressing changes   2024 by Sandra Taylor RN  Outcome: Adequate for Discharge  2024 by Sandra Taylor RN  Outcome: Progressing     Problem: PAIN - ADULT  Goal: Verbalizes/displays adequate comfort level or baseline comfort level  Description: Interventions:  - Encourage patient to monitor pain and request assistance  - Assess pain using appropriate pain scale  - Administer analgesics based on type and severity of pain and evaluate response  - Implement non-pharmacological measures as appropriate and evaluate response  - Consider cultural and social influences on pain and pain management  - Notify physician/advanced practitioner if interventions unsuccessful or patient reports new pain  2024  by Sandra Taylor RN  Outcome: Adequate for Discharge  12/16/2024 2100 by Sandra Taylor RN  Outcome: Progressing     Problem: INFECTION - ADULT  Goal: Absence or prevention of progression during hospitalization  Description: INTERVENTIONS:  - Assess and monitor for signs and symptoms of infection  - Monitor lab/diagnostic results  - Monitor all insertion sites, i.e. indwelling lines, tubes, and drains  - Monitor endotracheal if appropriate and nasal secretions for changes in amount and color  - [place] appropriate cooling/warming therapies per order  - Administer medications as ordered  - Instruct and encourage patient and family to use good hand hygiene technique  - Identify and instruct in appropriate isolation precautions for identified infection/condition  12/16/2024 2101 by Sandra Taylor RN  Outcome: Adequate for Discharge  12/16/2024 2100 by Sandra Taylor RN  Outcome: Progressing  Goal: Absence of fever/infection during neutropenic period  Description: INTERVENTIONS:  - Monitor WBC    12/16/2024 2101 by Sandra Taylor RN  Outcome: Adequate for Discharge  12/16/2024 2100 by Sandra Taylor RN  Outcome: Progressing     Problem: SAFETY ADULT  Goal: Patient will remain free of falls  Description: INTERVENTIONS:  - Educate patient/family on patient safety including physical limitations  - Instruct patient to call for assistance with activity   - Consult OT/PT to assist with strengthening/mobility   - Keep Call bell within reach  - Keep bed low and locked with side rails adjusted as appropriate  - Keep care items and personal belongings within reach  - Initiate and maintain comfort rounds  - Make Fall Risk Sign visible to staff  - Offer Toileting every 2 Hours, in advance of need     - Apply yellow socks and bracelet for high fall risk patients  - Consider moving patient to room near nurses station  12/16/2024 2101 by Sandra Taylor RN  Outcome: Adequate for Discharge  12/16/2024 2100 by Sandra Taylor  RN  Outcome: Progressing  Goal: Maintain or return to baseline ADL function  Description: INTERVENTIONS:  -  Assess patient's ability to carry out ADLs; assess patient's baseline for ADL function and identify physical deficits which impact ability to perform ADLs (bathing, care of mouth/teeth, toileting, grooming, dressing, etc.)  - Assess/evaluate cause of self-care deficits   - Assess range of motion  - Assess patient's mobility; develop plan if impaired  - Assess patient's need for assistive devices and provide as appropriate  - Encourage maximum independence but intervene and supervise when necessary  - Involve family in performance of ADLs  - Assess for home care needs following discharge   - Consider OT consult to assist with ADL evaluation and planning for discharge  - Provide patient education as appropriate  12/16/2024 2101 by Sandra Taylor RN  Outcome: Adequate for Discharge  12/16/2024 2100 by Sandra Taylor RN  Outcome: Progressing  Goal: Maintains/Returns to pre admission functional level  Description: INTERVENTIONS:  - Perform AM-PAC 6 Click Basic Mobility/ Daily Activity assessment daily.  - Set and communicate daily mobility goal to care team and patient/family/caregiver.   - Collaborate with rehabilitation services on mobility goals if consulted  - Perform Range of Motion 3 times a day.  - Reposition patient every 2 hours.  - Dangle patient 3 times a day  - Stand patient 3 times a day  - Ambulate patient 3 times a day  - Out of bed to chair 3 times a day   - Out of bed for meals 3 times a day  - Out of bed for toileting  - Record patient progress and toleration of activity level   12/16/2024 2101 by Sandra Taylor RN  Outcome: Adequate for Discharge  12/16/2024 2100 by Sandra Taylor RN  Outcome: Progressing     Problem: Knowledge Deficit  Goal: Patient/family/caregiver demonstrates understanding of disease process, treatment plan, medications, and discharge instructions  Description: Complete  learning assessment and assess knowledge base.  Interventions:  - Provide teaching at level of understanding  - Provide teaching via preferred learning methods  12/16/2024 2101 by Sandra Taylor RN  Outcome: Adequate for Discharge  12/16/2024 2100 by Sandra Taylor RN  Outcome: Progressing     Problem: DISCHARGE PLANNING  Goal: Discharge to home or other facility with appropriate resources  Description: INTERVENTIONS:  - Identify barriers to discharge w/patient and caregiver  - Arrange for needed discharge resources and transportation as appropriate  - Identify discharge learning needs (meds, wound care, etc.)  - Arrange for interpretive services to assist at discharge as needed  - Refer to Case Management Department for coordinating discharge planning if the patient needs post-hospital services based on physician/advanced practitioner order or complex needs related to functional status, cognitive ability, or social support system  12/16/2024 2101 by Sandra Taylor RN  Outcome: Adequate for Discharge  12/16/2024 2100 by Sandra Taylor RN  Outcome: Progressing

## 2024-12-17 NOTE — PLAN OF CARE

## 2024-12-19 ENCOUNTER — TELEPHONE (OUTPATIENT)
Dept: OBGYN CLINIC | Facility: CLINIC | Age: 29
End: 2024-12-19

## 2024-12-19 NOTE — TELEPHONE ENCOUNTER
POSTPARTUM PHONE CALL ASSESSMENT    Date of Delivery: 24  Delivering Provider: Dr. Rodriguez  Mode:      Delivery Notes/Complications: preE, PTD @ 36w5d   Do you still have bleeding/pain? If so, how much/how severe? VB light, lessening. Denies pain.   Regular BMs/Urination? yes  Breastfeeding/Formula/Both? Pumping, baby in NICU (baby anticipated to be discharged to home tomorrow 24)  How are you doing emotionally? Patient reports she is doing much better now, has support. Reviewed baby and me services, will call if further support or resources needed.  Do you have any other questions or concerns for us or your provider? None at this time. Not monitoring BP at home. Mild HA yesterday contributed to crying per pt. Took tylenol and resolved. No HA at this time and denies edema, vision changes, RUQ pain, N/V. Reviewed if HA persistent, not relieved with tylenol or change in symptoms to call.  Do you have a postpartum visit scheduled? BP check scheduled 24

## 2024-12-20 NOTE — PROGRESS NOTES
Post-Partum Checkup Visit    Ya Montez is a 29 y.o.  female     Assessment:  Delivered a male  (5lb 11oz) via  with epidural on 24 (1 laceration, bilateral labials) after admission for PPROM, doing well today.     Hx of pre-e without severe features. Denies PIH symptoms. She reports occasional headaches, relieved with tylenol. She does not take blood pressures at home. She is not on medication.    Plan:  Breastfeeding: reviewed [facility]. Referral placed.  Depression score: EPDS - 4  Activity: restrict until 6 weeks  RTO for scheduled postpartum appt and PRN    Problem List Items Addressed This Visit    None  Visit Diagnoses         Lactation disorder    -  Primary    Relevant Orders    Ambulatory Referral to Lactation            Subjective/Objective     Subjective:   Pain: no  Tolerating Oral Intake: yes  Voiding: yes  Flatus: yes  Bowel Movement: yes  Ambulating: yes  Breastfeeding: Using breast pump, struggling with latching and cracked nipples  Chest Pain: no  Shortness of Breath: no  Leg Pain/Discomfort: no  Lochia: normal     Objective:     Postpartum Depression: Low Risk  (2024)    [place]  Depression Scale     Last EPDS Total Score: 4     Last EPDS Self Harm Result: Never       Vitals:  Vitals:    24 0851   BP: 110/86       Physical Exam:  Physical Exam  Vitals and nursing note reviewed.   Constitutional:       General: She is not in acute distress.     Appearance: Normal appearance.   HENT:      Head: Normocephalic.   Eyes:      Conjunctiva/sclera: Conjunctivae normal.   Cardiovascular:      Rate and Rhythm: Normal rate and regular rhythm.      Heart sounds: Normal heart sounds.   Pulmonary:      Effort: Pulmonary effort is normal.      Breath sounds: Normal breath sounds.   Abdominal:      General: Abdomen is flat.      Palpations: Abdomen is soft.   Musculoskeletal:         General: Normal range of motion.      Cervical back: Normal range of  motion.      Right lower leg: No edema.      Left lower leg: No edema.   Lymphadenopathy:      Cervical: No cervical adenopathy.   Skin:     General: Skin is warm and dry.   Neurological:      Mental Status: She is alert and oriented to person, place, and time.   Psychiatric:         Mood and Affect: Mood normal.         Behavior: Behavior normal.         Thought Content: Thought content normal.         Judgment: Judgment normal.           OB Hx:  OB History    Para Term  AB Living   1 1 0 1 0 1   SAB IAB Ectopic Multiple Live Births   0 0 0 0 1      # Outcome Date GA Lbr Kg/2nd Weight Sex Type Anes PTL Lv   1  24 36w5d / 00:18 2590 g (5 lb 11.4 oz) M Vag-Spont EPI N MELISSA      Name: Christian Cody      Apgar1: 8  Apgar5: 8     Medical Hx  Past Medical History:   Diagnosis Date    Abnormal Pap smear of cervix     Never had one    Migraine     Varicella     as a child     Surgical Hx  History reviewed. No pertinent surgical history.  Family Hx  Family History   Problem Relation Age of Onset    No Known Problems Mother     No Known Problems Father     Anxiety disorder Sister     Anxiety disorder Sister     No Known Problems Maternal Grandmother     Alcohol abuse Maternal Grandfather     No Known Problems Paternal Grandmother     Other Paternal Grandfather         Brain Injury hit by a car, passed from choking on a doughnut from not being supervised     Social Hx  Social History     Socioeconomic History    Marital status: /Civil Union     Spouse name: Not on file    Number of children: Not on file    Years of education: Not on file    Highest education level: Not on file   Occupational History    Not on file   Tobacco Use    Smoking status: Never    Smokeless tobacco: Never   Vaping Use    Vaping status: Former    Substances: Nicotine, Flavoring   Substance and Sexual Activity    Alcohol use: Not Currently     Comment: pregnant    Drug use: Not Currently     Types: Marijuana      Comment: as a teenager    Sexual activity: Not Currently     Partners: Male     Birth control/protection: None   Other Topics Concern    Not on file   Social History Narrative    Not on file     Social Drivers of Health     Financial Resource Strain: Not on file   Food Insecurity: No Food Insecurity (2024)    Nursing - Inadequate Food Risk Classification     Worried About Running Out of Food in the Last Year: Never true     Ran Out of Food in the Last Year: Never true     Ran Out of Food in the Last Year: 1   Transportation Needs: No Transportation Needs (2024)    Nursing - Transportation Risk Classification     Lack of Transportation: Not on file     Lack of Transportation: 2   Physical Activity: Not on file   Stress: Not on file   Social Connections: Not on file   Intimate Partner Violence: Unknown (2024)    Nursing IPS     Feels Physically and Emotionally Safe: Not on file     Physically Hurt by Someone: Not on file     Humiliated or Emotionally Abused by Someone: Not on file     Physically Hurt by Someone: 2     Hurt or Threatened by Someone: 2   Housing Stability: Unknown (2024)    Nursing: Inadequate Housing Risk Classification     Has Housing: Not on file     Worried About Losing Housing: Not on file     Unable to Get Utilities: Not on file     Unable to Pay for Housing in the Last Year: 2     Has Housin     Allergies  Allergies   Allergen Reactions    Lotrimin [Clotrimazole] Rash         OB/GYN  2024  8:59 AM

## 2024-12-21 LAB — PLACENTA IN STORAGE: NORMAL

## 2024-12-24 ENCOUNTER — OFFICE VISIT (OUTPATIENT)
Dept: OBGYN CLINIC | Facility: CLINIC | Age: 29
End: 2024-12-24

## 2024-12-24 VITALS
WEIGHT: 151 LBS | SYSTOLIC BLOOD PRESSURE: 110 MMHG | BODY MASS INDEX: 25.78 KG/M2 | DIASTOLIC BLOOD PRESSURE: 86 MMHG | HEIGHT: 64 IN

## 2024-12-24 DIAGNOSIS — O92.70 LACTATION DISORDER: Primary | ICD-10-CM

## 2024-12-24 PROCEDURE — PNV

## 2025-01-09 NOTE — PROGRESS NOTES
Post-Partum Checkup Visit    Ya Montez is a 29 y.o.  female     Assessment:  Delivered a male  (5lb 11oz) via  with epidural (1 lac, bilateral labials) on 24 after admission for PPROM, doing well today. Hx of Pre-e    Denies PIH symptoms. She reports recent episode of lightheadedness but believes it was related to lack of taking prenatal and food.    Plan:  Breastfeeding: continue latching/pumping  Contraception: condoms  Depression score: EPDS - 2.   Activity: restrict until 6 weeks  RTO 3 months for annual exam and PRN      Problem List Items Addressed This Visit        (spontaneous vaginal delivery) - Primary       Subjective/Objective     Subjective:   Pain: no  Tolerating Oral Intake: yes  Voiding: yes  Flatus: yes  Bowel Movement: yes  Ambulating: yes  Breastfeeding: Breastfeeding, pumping and latching  Chest Pain: no  Shortness of Breath: no  Leg Pain/Discomfort: no  Lochia: minimal   EPDS: 2, she has good support at home; Denies SI/HI    Objective:     Postpartum Depression: Low Risk  (1/10/2025)    [place]  Depression Scale     Last EPDS Total Score: 2     Last EPDS Self Harm Result: Never       Vitals:  Vitals:    01/10/25 1129   BP: 112/76       Physical Exam:  Physical Exam  Vitals and nursing note reviewed.   Constitutional:       General: She is not in acute distress.     Appearance: Normal appearance.   HENT:      Head: Normocephalic.   Eyes:      Conjunctiva/sclera: Conjunctivae normal.   Cardiovascular:      Rate and Rhythm: Normal rate and regular rhythm.      Heart sounds: Normal heart sounds.   Pulmonary:      Effort: Pulmonary effort is normal.      Breath sounds: Normal breath sounds.   Abdominal:      General: Abdomen is flat.      Palpations: Abdomen is soft.   Genitourinary:     General: Normal vulva.      Exam position: Lithotomy position.      Pubic Area: No rash or pubic lice.       Labia:         Right: No rash or tenderness.         Left: No  rash or tenderness.       Urethra: No urethral pain.      Vagina: Bleeding present.      Cervix: Normal.      Uterus: Normal.       Adnexa: Right adnexa normal and left adnexa normal.        Right: No mass or tenderness.          Left: No mass or tenderness.        Comments: +minimal bleeding.  Musculoskeletal:         General: Normal range of motion.      Cervical back: Normal range of motion.      Right lower leg: No edema.      Left lower leg: No edema.   Lymphadenopathy:      Cervical: No cervical adenopathy.      Lower Body: No right inguinal adenopathy. No left inguinal adenopathy.   Skin:     General: Skin is warm and dry.   Neurological:      Mental Status: She is alert and oriented to person, place, and time.   Psychiatric:         Mood and Affect: Mood normal.         Behavior: Behavior normal.         Thought Content: Thought content normal.         Judgment: Judgment normal.           OB Hx:  OB History    Para Term  AB Living   1 1 0 1 0 1   SAB IAB Ectopic Multiple Live Births   0 0 0 0 1      # Outcome Date GA Lbr Kg/2nd Weight Sex Type Anes PTL Lv   1  24 36w5d / 00:18 2590 g (5 lb 11.4 oz) M Vag-Spont EPI N MELISSA      Name: Christian Cody      Apgar1: 8  Apgar5: 8     Medical Hx  Past Medical History:   Diagnosis Date    Abnormal Pap smear of cervix     Never had one    Migraine     Varicella     as a child     Surgical Hx  History reviewed. No pertinent surgical history.  Family Hx  Family History   Problem Relation Age of Onset    No Known Problems Mother     No Known Problems Father     Anxiety disorder Sister     Anxiety disorder Sister     No Known Problems Maternal Grandmother     Alcohol abuse Maternal Grandfather     No Known Problems Paternal Grandmother     Other Paternal Grandfather         Brain Injury hit by a car, passed from choking on a doughnut from not being supervised     Social Hx  Social History     Socioeconomic History    Marital status:  /Civil Union     Spouse name: Not on file    Number of children: Not on file    Years of education: Not on file    Highest education level: Not on file   Occupational History    Not on file   Tobacco Use    Smoking status: Never    Smokeless tobacco: Never   Vaping Use    Vaping status: Former    Substances: Nicotine, Flavoring   Substance and Sexual Activity    Alcohol use: Yes     Comment: pregnant    Drug use: Not Currently     Types: Marijuana     Comment: as a teenager    Sexual activity: Not Currently     Partners: Male     Birth control/protection: None   Other Topics Concern    Not on file   Social History Narrative    Not on file     Social Drivers of Health     Financial Resource Strain: Not on file   Food Insecurity: No Food Insecurity (2024)    Nursing - Inadequate Food Risk Classification     Worried About Running Out of Food in the Last Year: Never true     Ran Out of Food in the Last Year: Never true     Ran Out of Food in the Last Year: Never true   Transportation Needs: No Transportation Needs (2024)    Nursing - Transportation Risk Classification     Lack of Transportation: Not on file     Lack of Transportation: No   Physical Activity: Not on file   Stress: Not on file   Social Connections: Not on file   Intimate Partner Violence: Unknown (2024)    Nursing IPS     Feels Physically and Emotionally Safe: Not on file     Physically Hurt by Someone: Not on file     Humiliated or Emotionally Abused by Someone: Not on file     Physically Hurt by Someone: No     Hurt or Threatened by Someone: No   Housing Stability: Unknown (2024)    Nursing: Inadequate Housing Risk Classification     Has Housing: Not on file     Worried About Losing Housing: Not on file     Unable to Get Utilities: Not on file     Unable to Pay for Housing in the Last Year: No     Has Housin     Allergies  Allergies   Allergen Reactions    Lotrimin [Clotrimazole] Elpidio Cobian  DEIDRA  OB/GYN  1/10/2025  11:42 AM

## 2025-01-10 ENCOUNTER — POSTPARTUM VISIT (OUTPATIENT)
Dept: OBGYN CLINIC | Facility: CLINIC | Age: 30
End: 2025-01-10

## 2025-01-10 VITALS
SYSTOLIC BLOOD PRESSURE: 112 MMHG | BODY MASS INDEX: 25.61 KG/M2 | DIASTOLIC BLOOD PRESSURE: 76 MMHG | HEIGHT: 64 IN | WEIGHT: 150 LBS

## 2025-01-10 PROCEDURE — PNV

## 2025-02-24 ENCOUNTER — TELEPHONE (OUTPATIENT)
Dept: OBGYN CLINIC | Facility: CLINIC | Age: 30
End: 2025-02-24

## 2025-02-24 NOTE — TELEPHONE ENCOUNTER
LMOM for pt to call office back to collect $15 fmla copayment. Once copayment is collected, we can fax forms and send to pt's mychart. [employer] message sent to pt in regards as well.